# Patient Record
Sex: FEMALE | Race: WHITE | Employment: FULL TIME | URBAN - METROPOLITAN AREA
[De-identification: names, ages, dates, MRNs, and addresses within clinical notes are randomized per-mention and may not be internally consistent; named-entity substitution may affect disease eponyms.]

---

## 2018-07-11 ENCOUNTER — HOSPITAL ENCOUNTER (EMERGENCY)
Age: 23
Discharge: HOME | End: 2018-07-11
Attending: EMERGENCY MEDICINE
Payer: COMMERCIAL

## 2018-07-11 VITALS
OXYGEN SATURATION: 100 % | HEIGHT: 66 IN | HEART RATE: 106 BPM | SYSTOLIC BLOOD PRESSURE: 143 MMHG | DIASTOLIC BLOOD PRESSURE: 91 MMHG | WEIGHT: 180 LBS | TEMPERATURE: 98.6 F | BODY MASS INDEX: 28.93 KG/M2 | RESPIRATION RATE: 16 BRPM

## 2018-07-11 DIAGNOSIS — R19.7 DIARRHEA, UNSPECIFIED TYPE: ICD-10-CM

## 2018-07-11 DIAGNOSIS — R10.9 ACUTE ABDOMINAL PAIN: Primary | ICD-10-CM

## 2018-07-11 LAB
ALBUMIN SERPL-MCNC: 3.9 G/DL (ref 3.5–5)
ALBUMIN/GLOB SERPL: 1.1 {RATIO} (ref 1.2–3.5)
ALP SERPL-CCNC: 63 U/L (ref 50–136)
ALT SERPL-CCNC: 31 U/L (ref 12–65)
ANION GAP SERPL CALC-SCNC: 3 MMOL/L (ref 7–16)
AST SERPL-CCNC: 28 U/L (ref 15–37)
BACTERIA URNS QL MICRO: 0 /HPF
BASOPHILS # BLD: 0 K/UL (ref 0–0.2)
BASOPHILS NFR BLD: 0 % (ref 0–2)
BILIRUB SERPL-MCNC: 0.3 MG/DL (ref 0.2–1.1)
BUN SERPL-MCNC: 9 MG/DL (ref 6–23)
CALCIUM SERPL-MCNC: 8.8 MG/DL (ref 8.3–10.4)
CASTS URNS QL MICRO: 0 /LPF
CHLORIDE SERPL-SCNC: 103 MMOL/L (ref 98–107)
CO2 SERPL-SCNC: 28 MMOL/L (ref 21–32)
CREAT SERPL-MCNC: 0.79 MG/DL (ref 0.6–1)
CRYSTALS URNS QL MICRO: 0 /LPF
DIFFERENTIAL METHOD BLD: ABNORMAL
EOSINOPHIL # BLD: 0.3 K/UL (ref 0–0.8)
EOSINOPHIL NFR BLD: 3 % (ref 0.5–7.8)
EPI CELLS #/AREA URNS HPF: NORMAL /HPF
ERYTHROCYTE [DISTWIDTH] IN BLOOD BY AUTOMATED COUNT: 13.4 % (ref 11.9–14.6)
GLOBULIN SER CALC-MCNC: 3.7 G/DL (ref 2.3–3.5)
GLUCOSE SERPL-MCNC: 102 MG/DL (ref 65–100)
HCG UR QL: NEGATIVE
HCT VFR BLD AUTO: 38.4 % (ref 35.8–46.3)
HGB BLD-MCNC: 12.7 G/DL (ref 11.7–15.4)
IMM GRANULOCYTES # BLD: 0 K/UL (ref 0–0.5)
IMM GRANULOCYTES NFR BLD AUTO: 0 % (ref 0–5)
LIPASE SERPL-CCNC: 114 U/L (ref 73–393)
LYMPHOCYTES # BLD: 1.6 K/UL (ref 0.5–4.6)
LYMPHOCYTES NFR BLD: 19 % (ref 13–44)
MCH RBC QN AUTO: 26.2 PG (ref 26.1–32.9)
MCHC RBC AUTO-ENTMCNC: 33.1 G/DL (ref 31.4–35)
MCV RBC AUTO: 79.3 FL (ref 79.6–97.8)
MONOCYTES # BLD: 0.7 K/UL (ref 0.1–1.3)
MONOCYTES NFR BLD: 8 % (ref 4–12)
MUCOUS THREADS URNS QL MICRO: 0 /LPF
NEUTS SEG # BLD: 6 K/UL (ref 1.7–8.2)
NEUTS SEG NFR BLD: 70 % (ref 43–78)
PLATELET # BLD AUTO: 342 K/UL (ref 150–450)
PMV BLD AUTO: 10.4 FL (ref 10.8–14.1)
POTASSIUM SERPL-SCNC: 4.3 MMOL/L (ref 3.5–5.1)
PROT SERPL-MCNC: 7.6 G/DL (ref 6.3–8.2)
RBC # BLD AUTO: 4.84 M/UL (ref 4.05–5.25)
RBC #/AREA URNS HPF: 0 /HPF
SODIUM SERPL-SCNC: 134 MMOL/L (ref 136–145)
WBC # BLD AUTO: 8.6 K/UL (ref 4.3–11.1)
WBC URNS QL MICRO: NORMAL /HPF

## 2018-07-11 PROCEDURE — 81015 MICROSCOPIC EXAM OF URINE: CPT | Performed by: EMERGENCY MEDICINE

## 2018-07-11 PROCEDURE — 81025 URINE PREGNANCY TEST: CPT

## 2018-07-11 PROCEDURE — 83690 ASSAY OF LIPASE: CPT | Performed by: EMERGENCY MEDICINE

## 2018-07-11 PROCEDURE — 96360 HYDRATION IV INFUSION INIT: CPT | Performed by: EMERGENCY MEDICINE

## 2018-07-11 PROCEDURE — 85025 COMPLETE CBC W/AUTO DIFF WBC: CPT | Performed by: EMERGENCY MEDICINE

## 2018-07-11 PROCEDURE — 99283 EMERGENCY DEPT VISIT LOW MDM: CPT | Performed by: EMERGENCY MEDICINE

## 2018-07-11 PROCEDURE — 81003 URINALYSIS AUTO W/O SCOPE: CPT | Performed by: EMERGENCY MEDICINE

## 2018-07-11 PROCEDURE — 80053 COMPREHEN METABOLIC PANEL: CPT | Performed by: EMERGENCY MEDICINE

## 2018-07-11 PROCEDURE — 74011250636 HC RX REV CODE- 250/636: Performed by: EMERGENCY MEDICINE

## 2018-07-11 RX ORDER — TRAMADOL HYDROCHLORIDE 50 MG/1
50 TABLET ORAL
Qty: 15 TAB | Refills: 0 | Status: SHIPPED | OUTPATIENT
Start: 2018-07-11 | End: 2018-08-31

## 2018-07-11 RX ADMIN — SODIUM CHLORIDE 1000 ML: 900 INJECTION, SOLUTION INTRAVENOUS at 17:57

## 2018-07-11 NOTE — ED TRIAGE NOTES
Abdominal cramping and vomiting over 3 weeks. Has seen PCP, had ultrasound, xray, labs, treated with antibiotics, advil. Symptoms initially improved and returned. Has apt with gastroenterologist, and ob/gyn on the 19th. However pain is intense and patient was advised by pcp to come to ED.

## 2018-07-11 NOTE — DISCHARGE INSTRUCTIONS
Consider cultured yogurt and half an apple at least once daily. Abdominal Pain: Care Instructions  Your Care Instructions    Abdominal pain has many possible causes. Some aren't serious and get better on their own in a few days. Others need more testing and treatment. If your pain continues or gets worse, you need to be rechecked and may need more tests to find out what is wrong. You may need surgery to correct the problem. Don't ignore new symptoms, such as fever, nausea and vomiting, urination problems, pain that gets worse, and dizziness. These may be signs of a more serious problem. Your doctor may have recommended a follow-up visit in the next 8 to 12 hours. If you are not getting better, you may need more tests or treatment. The doctor has checked you carefully, but problems can develop later. If you notice any problems or new symptoms, get medical treatment right away. Follow-up care is a key part of your treatment and safety. Be sure to make and go to all appointments, and call your doctor if you are having problems. It's also a good idea to know your test results and keep a list of the medicines you take. How can you care for yourself at home? · Rest until you feel better. · To prevent dehydration, drink plenty of fluids, enough so that your urine is light yellow or clear like water. Choose water and other caffeine-free clear liquids until you feel better. If you have kidney, heart, or liver disease and have to limit fluids, talk with your doctor before you increase the amount of fluids you drink. · If your stomach is upset, eat mild foods, such as rice, dry toast or crackers, bananas, and applesauce. Try eating several small meals instead of two or three large ones. · Wait until 48 hours after all symptoms have gone away before you have spicy foods, alcohol, and drinks that contain caffeine. · Do not eat foods that are high in fat.   · Avoid anti-inflammatory medicines such as aspirin, ibuprofen (Advil, Motrin), and naproxen (Aleve). These can cause stomach upset. Talk to your doctor if you take daily aspirin for another health problem. When should you call for help? Call 911 anytime you think you may need emergency care. For example, call if:    · You passed out (lost consciousness).     · You pass maroon or very bloody stools.     · You vomit blood or what looks like coffee grounds.     · You have new, severe belly pain.    Call your doctor now or seek immediate medical care if:    · Your pain gets worse, especially if it becomes focused in one area of your belly.     · You have a new or higher fever.     · Your stools are black and look like tar, or they have streaks of blood.     · You have unexpected vaginal bleeding.     · You have symptoms of a urinary tract infection. These may include:  ¨ Pain when you urinate. ¨ Urinating more often than usual.  ¨ Blood in your urine.     · You are dizzy or lightheaded, or you feel like you may faint.    Watch closely for changes in your health, and be sure to contact your doctor if:    · You are not getting better after 1 day (24 hours). Where can you learn more? Go to http://bartoloReceeptshraddha.info/. Enter W986 in the search box to learn more about \"Abdominal Pain: Care Instructions. \"  Current as of: November 20, 2017  Content Version: 11.7  © 2354-1112 [employer]. Care instructions adapted under license by [employer] (which disclaims liability or warranty for this information). If you have questions about a medical condition or this instruction, always ask your healthcare professional. [place] [address] any warranty or liability for your use of this information. Diarrhea: Care Instructions  Your Care Instructions    Diarrhea is loose, watery stools (bowel movements). The exact cause is often hard to find.  Sometimes diarrhea is your body's way of getting rid of what caused an upset stomach. Viruses, food poisoning, and many medicines can cause diarrhea. Some people get diarrhea in response to emotional stress, anxiety, or certain foods. Almost everyone has diarrhea now and then. It usually isn't serious, and your stools will return to normal soon. The important thing to do is replace the fluids you have lost, so you can prevent dehydration. The doctor has checked you carefully, but problems can develop later. If you notice any problems or new symptoms, get medical treatment right away. Follow-up care is a key part of your treatment and safety. Be sure to make and go to all appointments, and call your doctor if you are having problems. It's also a good idea to know your test results and keep a list of the medicines you take. How can you care for yourself at home? · Watch for signs of dehydration, which means your body has lost too much water. Dehydration is a serious condition and should be treated right away. Signs of dehydration are:  ¨ Increasing thirst and dry eyes and mouth. ¨ Feeling faint or lightheaded. ¨ Darker urine, and a smaller amount of urine than normal.  · To prevent dehydration, drink plenty of fluids, enough so that your urine is light yellow or clear like water. Choose water and other caffeine-free clear liquids until you feel better. If you have kidney, heart, or liver disease and have to limit fluids, talk with your doctor before you increase the amount of fluids you drink. · Begin eating small amounts of mild foods the next day, if you feel like it. ¨ Try yogurt that has live cultures of Lactobacillus. (Check the label.)  ¨ Avoid spicy foods, fruits, alcohol, and caffeine until 48 hours after all symptoms are gone. ¨ Avoid chewing gum that contains sorbitol. ¨ Avoid dairy products (except for yogurt with Lactobacillus) while you have diarrhea and for 3 days after symptoms are gone.   · The doctor may recommend that you take over-the-counter medicine, such as loperamide (Imodium), if you still have diarrhea after 6 hours. Read and follow all instructions on the label. Do not use this medicine if you have bloody diarrhea, a high fever, or other signs of serious illness. Call your doctor if you think you are having a problem with your medicine. When should you call for help? Call 911 anytime you think you may need emergency care. For example, call if:    · You passed out (lost consciousness).     · Your stools are maroon or very bloody.    Call your doctor now or seek immediate medical care if:    · You are dizzy or lightheaded, or you feel like you may faint.     · Your stools are black and look like tar, or they have streaks of blood.     · You have new or worse belly pain.     · You have symptoms of dehydration, such as:  ¨ Dry eyes and a dry mouth. ¨ Passing only a little dark urine. ¨ Feeling thirstier than usual.     · You have a new or higher fever.    Watch closely for changes in your health, and be sure to contact your doctor if:    · Your diarrhea is getting worse.     · You see pus in the diarrhea.     · You are not getting better after 2 days (48 hours). Where can you learn more? Go to http://bartolo-shraddha.info/. Enter Z370 in the search box to learn more about \"Diarrhea: Care Instructions. \"  Current as of: November 20, 2017  Content Version: 11.7  © 7323-7151 [employer]. Care instructions adapted under license by [employer] (which disclaims liability or warranty for this information). If you have questions about a medical condition or this instruction, always ask your healthcare professional. [place] [address] any warranty or liability for your use of this information.

## 2018-07-11 NOTE — LETTER
[facility] EMERGENCY DEPT 
[facility] [address] [place] [id number] 
[phone] Work/School Note Date: 7/11/2018 To Whom It May concern: 
 
Slade Cook was seen and treated today in the emergency room by the following provider(s): 
Attending Provider: Noemí Dee MD. Slade Cook may return to work on 7/13/18.  
 
Sincerely, 
 
 
 
 
Faiza Veras RN

## 2018-07-11 NOTE — ED NOTES
I have reviewed discharge instructions with the patient. The patient verbalized understanding. Patient left ED via Discharge Method: ambulatory to Home with her mother    Opportunity for questions and clarification provided. Patient given 1 scripts. To continue your aftercare when you leave the hospital, you may receive an automated call from our care team to check in on how you are doing. This is a free service and part of our promise to provide the best care and service to meet your aftercare needs.  If you have questions, or wish to unsubscribe from this service please call [phone]. Thank you for Choosing our [facility] Emergency Department.

## 2018-07-11 NOTE — ED PROVIDER NOTES
HPI Comments: Patient complains of abdominal pain and cramping over the last 3-4 weeks. Has been seen a couple times her family doctor, screening labs x-ray ultrasound and treated with Advil, Bentyl, question of antibiotics. Currently scheduled follow-up with a gastroenterologist on the 17th and her OB/GYN on the 19th. Last menstrual period 2-1/2 weeks ago and normal for the patient. Patient is a 26 y.o. female presenting with abdominal pain. The history is provided by the patient and a parent. Abdominal Pain    This is a new problem. The current episode started more than 1 week ago. The problem occurs daily. The problem has not changed since onset. The pain is associated with an unknown factor. The pain is located in the generalized abdominal region, RUQ, suprapubic region and LLQ. The quality of the pain is aching and sharp. The pain is at a severity of 7/10. Associated symptoms include diarrhea (for the past month, now about 3 times a day), vomiting (a couple of weeks ago) and dysuria (occasional). Pertinent negatives include no anorexia, no fever, no belching, no flatus, no hematochezia, no melena, no nausea, no constipation, no frequency, no hematuria, no headaches, no arthralgias, no myalgias, no trauma, no chest pain and no back pain. Exacerbated by: possibly stress. The pain is relieved by nothing. Past workup includes ultrasound. Past workup comments: scheduled to see gastroenterology on the 17th this month. Her past medical history is significant for ovarian cysts. Her past medical history does not include PUD, gallstones, GERD, ulcerative colitis, Crohn's disease, irritable bowel syndrome, cancer, UTI, pancreatitis, ectopic pregnancy, diverticulitis, atrial fibrillation, DM, kidney stones or small bowel obstruction. The patient's surgical history non-contributory. History reviewed. No pertinent past medical history. History reviewed. No pertinent surgical history. History reviewed.  No pertinent family history. Social History     Social History    Marital status: SINGLE     Spouse name: N/A    Number of children: N/A    Years of education: N/A     Occupational History    Not on file. Social History Main Topics    Smoking status: Not on file    Smokeless tobacco: Not on file    Alcohol use Not on file    Drug use: Not on file    Sexual activity: Not on file     Other Topics Concern    Not on file     Social History Narrative    No narrative on file         ALLERGIES: Review of patient's allergies indicates no known allergies. Review of Systems   Constitutional: Positive for activity change, appetite change and fatigue. Negative for chills and fever. Cardiovascular: Negative for chest pain. Gastrointestinal: Positive for abdominal pain, diarrhea (for the past month, now about 3 times a day) and vomiting (a couple of weeks ago). Negative for abdominal distention, anal bleeding, anorexia, blood in stool, constipation, flatus, hematochezia, melena and nausea. Genitourinary: Positive for dysuria (occasional). Negative for frequency and hematuria. Musculoskeletal: Negative for arthralgias, back pain and myalgias. Neurological: Negative for headaches. All other systems reviewed and are negative. Vitals:    07/11/18 1656   BP: (!) 164/103   Pulse: (!) 106   Resp: 18   Temp: 99.8 °F (37.7 °C)   SpO2: 98%   Weight: 81.6 kg (180 lb)   Height: 5' 6\" (1.676 m)            Physical Exam   Constitutional: She is oriented to person, place, and time. She appears well-developed and well-nourished. No distress. HENT:   Head: Normocephalic and atraumatic. Right Ear: Tympanic membrane and external ear normal.   Left Ear: Tympanic membrane and external ear normal.   Mouth/Throat: Oropharynx is clear and moist.   Eyes: Conjunctivae and EOM are normal. Pupils are equal, round, and reactive to light. Neck: Normal range of motion. Neck supple. No tracheal deviation present. Cardiovascular: Normal rate, regular rhythm, normal heart sounds and intact distal pulses. Exam reveals no gallop and no friction rub. No murmur heard. Pulmonary/Chest: Effort normal and breath sounds normal. No respiratory distress. She has no wheezes. Abdominal: Soft. Bowel sounds are normal. She exhibits no distension and no mass. There is no hepatosplenomegaly. There is no tenderness. There is no rebound and no guarding. Musculoskeletal: Normal range of motion. She exhibits no edema. Lymphadenopathy:     She has no cervical adenopathy. Neurological: She is alert and oriented to person, place, and time. She displays normal reflexes. No cranial nerve deficit. Skin: Skin is warm and dry. No rash noted. She is not diaphoretic. No erythema. Psychiatric: She has a normal mood and affect. Nursing note and vitals reviewed.        MDM  Number of Diagnoses or Management Options  Acute abdominal pain: new and requires workup  Diarrhea, unspecified type: new and requires workup     Amount and/or Complexity of Data Reviewed  Clinical lab tests: ordered and reviewed  Review and summarize past medical records: yes    Risk of Complications, Morbidity, and/or Mortality  Presenting problems: high  Diagnostic procedures: minimal  Management options: moderate    Patient Progress  Patient progress: improved        ED Course       Procedures      Results Reviewed:      Recent Results (from the past 24 hour(s))   CBC WITH AUTOMATED DIFF    Collection Time: 07/11/18  5:18 PM   Result Value Ref Range    WBC 8.6 4.3 - 11.1 K/uL    RBC 4.84 4.05 - 5.25 M/uL    HGB 12.7 11.7 - 15.4 g/dL    HCT 38.4 35.8 - 46.3 %    MCV 79.3 (L) 79.6 - 97.8 FL    MCH 26.2 26.1 - 32.9 PG    MCHC 33.1 31.4 - 35.0 g/dL    RDW 13.4 11.9 - 14.6 %    PLATELET 160 470 - 678 K/uL    MPV 10.4 (L) 10.8 - 14.1 FL    DF AUTOMATED      NEUTROPHILS 70 43 - 78 %    LYMPHOCYTES 19 13 - 44 %    MONOCYTES 8 4.0 - 12.0 %    EOSINOPHILS 3 0.5 - 7.8 % BASOPHILS 0 0.0 - 2.0 %    IMMATURE GRANULOCYTES 0 0.0 - 5.0 %    ABS. NEUTROPHILS 6.0 1.7 - 8.2 K/UL    ABS. LYMPHOCYTES 1.6 0.5 - 4.6 K/UL    ABS. MONOCYTES 0.7 0.1 - 1.3 K/UL    ABS. EOSINOPHILS 0.3 0.0 - 0.8 K/UL    ABS. BASOPHILS 0.0 0.0 - 0.2 K/UL    ABS. IMM. GRANS. 0.0 0.0 - 0.5 K/UL   METABOLIC PANEL, COMPREHENSIVE    Collection Time: 07/11/18  5:18 PM   Result Value Ref Range    Sodium 134 (L) 136 - 145 mmol/L    Potassium 4.3 3.5 - 5.1 mmol/L    Chloride 103 98 - 107 mmol/L    CO2 28 21 - 32 mmol/L    Anion gap 3 (L) 7 - 16 mmol/L    Glucose 102 (H) 65 - 100 mg/dL    BUN 9 6 - 23 MG/DL    Creatinine 0.79 0.6 - 1.0 MG/DL    GFR est AA >60 >60 ml/min/1.73m2    GFR est non-AA >60 >60 ml/min/1.73m2    Calcium 8.8 8.3 - 10.4 MG/DL    Bilirubin, total 0.3 0.2 - 1.1 MG/DL    ALT (SGPT) 31 12 - 65 U/L    AST (SGOT) 28 15 - 37 U/L    Alk. phosphatase 63 50 - 136 U/L    Protein, total 7.6 6.3 - 8.2 g/dL    Albumin 3.9 3.5 - 5.0 g/dL    Globulin 3.7 (H) 2.3 - 3.5 g/dL    A-G Ratio 1.1 (L) 1.2 - 3.5     LIPASE    Collection Time: 07/11/18  5:18 PM   Result Value Ref Range    Lipase 114 73 - 393 U/L   HCG URINE, QL. - POC    Collection Time: 07/11/18  5:49 PM   Result Value Ref Range    Pregnancy test,urine (POC) NEGATIVE  NEG     URINE MICROSCOPIC    Collection Time: 07/11/18  5:55 PM   Result Value Ref Range    WBC 3-5 0 /hpf    RBC 0 0 /hpf    Epithelial cells 0-3 0 /hpf    Bacteria 0 0 /hpf    Casts 0 0 /lpf    Crystals, urine 0 0 /LPF    Mucus 0 0 /lpf       I discussed the results of all labs, procedures, radiographs, and treatments with the patient and available family. Treatment plan is agreed upon and the patient is ready for discharge. All voiced understanding of the discharge plan and medication instructions or changes as appropriate. Questions about treatment in the ED were answered. All were encouraged to return should symptoms worsen or new problems develop.

## 2018-07-19 PROBLEM — N93.9 MENSTRUAL BLEEDING PROBLEM: Status: ACTIVE | Noted: 2018-07-19

## 2018-07-19 PROBLEM — R10.2 PELVIC PAIN IN FEMALE: Status: ACTIVE | Noted: 2018-07-19

## 2018-07-22 ENCOUNTER — HOSPITAL ENCOUNTER (EMERGENCY)
Age: 23
Discharge: HOME | End: 2018-07-22
Attending: EMERGENCY MEDICINE
Payer: COMMERCIAL

## 2018-07-22 VITALS
TEMPERATURE: 98 F | DIASTOLIC BLOOD PRESSURE: 85 MMHG | WEIGHT: 220 LBS | HEART RATE: 76 BPM | SYSTOLIC BLOOD PRESSURE: 144 MMHG | HEIGHT: 66 IN | RESPIRATION RATE: 20 BRPM | BODY MASS INDEX: 35.36 KG/M2 | OXYGEN SATURATION: 97 %

## 2018-07-22 DIAGNOSIS — R10.84 ABDOMINAL PAIN, GENERALIZED: Primary | ICD-10-CM

## 2018-07-22 DIAGNOSIS — N39.0 URINARY TRACT INFECTION WITHOUT HEMATURIA, SITE UNSPECIFIED: ICD-10-CM

## 2018-07-22 LAB
ALBUMIN SERPL-MCNC: 3.9 G/DL (ref 3.5–5)
ALBUMIN/GLOB SERPL: 0.9 {RATIO} (ref 1.2–3.5)
ALP SERPL-CCNC: 69 U/L (ref 50–136)
ALT SERPL-CCNC: 25 U/L (ref 12–65)
ANION GAP SERPL CALC-SCNC: 12 MMOL/L (ref 7–16)
AST SERPL-CCNC: 40 U/L (ref 15–37)
BACTERIA URNS QL MICRO: ABNORMAL /HPF
BASOPHILS # BLD: 0 K/UL (ref 0–0.2)
BASOPHILS NFR BLD: 1 % (ref 0–2)
BILIRUB SERPL-MCNC: 0.4 MG/DL (ref 0.2–1.1)
BUN SERPL-MCNC: 7 MG/DL (ref 6–23)
CALCIUM SERPL-MCNC: 9.1 MG/DL (ref 8.3–10.4)
CASTS URNS QL MICRO: ABNORMAL /LPF
CHLORIDE SERPL-SCNC: 104 MMOL/L (ref 98–107)
CO2 SERPL-SCNC: 23 MMOL/L (ref 21–32)
CREAT SERPL-MCNC: 0.78 MG/DL (ref 0.6–1)
DIFFERENTIAL METHOD BLD: ABNORMAL
EOSINOPHIL # BLD: 0.2 K/UL (ref 0–0.8)
EOSINOPHIL NFR BLD: 2 % (ref 0.5–7.8)
EPI CELLS #/AREA URNS HPF: ABNORMAL /HPF
ERYTHROCYTE [DISTWIDTH] IN BLOOD BY AUTOMATED COUNT: 13.1 % (ref 11.9–14.6)
GLOBULIN SER CALC-MCNC: 4.5 G/DL (ref 2.3–3.5)
GLUCOSE SERPL-MCNC: 91 MG/DL (ref 65–100)
HCG UR QL: NEGATIVE
HCT VFR BLD AUTO: 42.7 % (ref 35.8–46.3)
HGB BLD-MCNC: 14.3 G/DL (ref 11.7–15.4)
IMM GRANULOCYTES # BLD: 0 K/UL (ref 0–0.5)
IMM GRANULOCYTES NFR BLD AUTO: 0 % (ref 0–5)
LIPASE SERPL-CCNC: 49 U/L (ref 73–393)
LYMPHOCYTES # BLD: 1.9 K/UL (ref 0.5–4.6)
LYMPHOCYTES NFR BLD: 23 % (ref 13–44)
MCH RBC QN AUTO: 27.1 PG (ref 26.1–32.9)
MCHC RBC AUTO-ENTMCNC: 33.5 G/DL (ref 31.4–35)
MCV RBC AUTO: 80.9 FL (ref 79.6–97.8)
MONOCYTES # BLD: 0.5 K/UL (ref 0.1–1.3)
MONOCYTES NFR BLD: 6 % (ref 4–12)
NEUTS SEG # BLD: 5.8 K/UL (ref 1.7–8.2)
NEUTS SEG NFR BLD: 68 % (ref 43–78)
PLATELET # BLD AUTO: 328 K/UL (ref 150–450)
PMV BLD AUTO: 10.9 FL (ref 10.8–14.1)
POTASSIUM SERPL-SCNC: 4.7 MMOL/L (ref 3.5–5.1)
PROT SERPL-MCNC: 8.4 G/DL (ref 6.3–8.2)
RBC # BLD AUTO: 5.28 M/UL (ref 4.05–5.25)
RBC #/AREA URNS HPF: ABNORMAL /HPF
SODIUM SERPL-SCNC: 139 MMOL/L (ref 136–145)
WBC # BLD AUTO: 8.4 K/UL (ref 4.3–11.1)
WBC URNS QL MICRO: ABNORMAL /HPF

## 2018-07-22 PROCEDURE — 83690 ASSAY OF LIPASE: CPT | Performed by: EMERGENCY MEDICINE

## 2018-07-22 PROCEDURE — 80053 COMPREHEN METABOLIC PANEL: CPT | Performed by: EMERGENCY MEDICINE

## 2018-07-22 PROCEDURE — 81003 URINALYSIS AUTO W/O SCOPE: CPT | Performed by: EMERGENCY MEDICINE

## 2018-07-22 PROCEDURE — 81025 URINE PREGNANCY TEST: CPT

## 2018-07-22 PROCEDURE — 96372 THER/PROPH/DIAG INJ SC/IM: CPT | Performed by: EMERGENCY MEDICINE

## 2018-07-22 PROCEDURE — 81015 MICROSCOPIC EXAM OF URINE: CPT | Performed by: EMERGENCY MEDICINE

## 2018-07-22 PROCEDURE — 74011250636 HC RX REV CODE- 250/636: Performed by: EMERGENCY MEDICINE

## 2018-07-22 PROCEDURE — 85025 COMPLETE CBC W/AUTO DIFF WBC: CPT | Performed by: EMERGENCY MEDICINE

## 2018-07-22 PROCEDURE — 74011250637 HC RX REV CODE- 250/637: Performed by: EMERGENCY MEDICINE

## 2018-07-22 PROCEDURE — 99284 EMERGENCY DEPT VISIT MOD MDM: CPT | Performed by: EMERGENCY MEDICINE

## 2018-07-22 RX ORDER — HYOSCYAMINE SULFATE 0.12 MG/1
0.12 TABLET SUBLINGUAL
Qty: 20 TAB | Refills: 0 | Status: SHIPPED | OUTPATIENT
Start: 2018-07-22 | End: 2020-03-04

## 2018-07-22 RX ORDER — SULFAMETHOXAZOLE AND TRIMETHOPRIM 800; 160 MG/1; MG/1
1 TABLET ORAL 2 TIMES DAILY
Qty: 6 TAB | Refills: 0 | Status: SHIPPED | OUTPATIENT
Start: 2018-07-22 | End: 2018-07-25

## 2018-07-22 RX ORDER — KETOROLAC TROMETHAMINE 30 MG/ML
60 INJECTION, SOLUTION INTRAMUSCULAR; INTRAVENOUS
Status: COMPLETED | OUTPATIENT
Start: 2018-07-22 | End: 2018-07-22

## 2018-07-22 RX ORDER — ONDANSETRON 8 MG/1
8 TABLET, ORALLY DISINTEGRATING ORAL
Qty: 8 TAB | Refills: 1 | Status: SHIPPED | OUTPATIENT
Start: 2018-07-22 | End: 2018-08-16

## 2018-07-22 RX ORDER — SULFAMETHOXAZOLE AND TRIMETHOPRIM 800; 160 MG/1; MG/1
1 TABLET ORAL
Status: COMPLETED | OUTPATIENT
Start: 2018-07-22 | End: 2018-07-22

## 2018-07-22 RX ADMIN — SULFAMETHOXAZOLE AND TRIMETHOPRIM 1 TABLET: 800; 160 TABLET ORAL at 19:50

## 2018-07-22 RX ADMIN — KETOROLAC TROMETHAMINE 60 MG: 30 INJECTION, SOLUTION INTRAMUSCULAR at 18:16

## 2018-07-22 NOTE — ED NOTES
I have reviewed discharge instructions with the patient. The patient verbalized understanding. Patient left ED via Discharge Method: ambulatory to Home with (insert name of family/friend, self, transport self). Opportunity for questions and clarification provided. Patient given 3 scripts. To continue your aftercare when you leave the hospital, you may receive an automated call from our care team to check in on how you are doing. This is a free service and part of our promise to provide the best care and service to meet your aftercare needs.  If you have questions, or wish to unsubscribe from this service please call [phone]. Thank you for Choosing our [facility] Emergency Department.

## 2018-07-22 NOTE — ED PROVIDER NOTES
HPI Comments: Here with some abdominal pain. Pain is mainly upper abdomen. She has been seen by both GI affiliated with [facility] and by Dr. Mike Whyte who she saw on July 19. Pain is both right upper quadrant and lower abdomen. Told she might have endometriosis there were no confirmation of this. Previous ultrasound has been reported as negative. the symptoms been ongoing now for approximately one and half months. No specific food provokers. No alcohol. No blood in her stool or melena. Sometimes has vomiting but only approximately 1 time per day and this is not typical    Patient is a 26 y.o. female presenting with abdominal pain. The history is provided by the patient. Abdominal Pain    This is a recurrent problem. Episode onset: 4-6 weeks. The problem has not changed since onset. The pain is associated with an unknown factor. The pain is located in the RUQ. Associated symptoms include diarrhea and vomiting. Pertinent negatives include no hematochezia, no melena, no constipation, no dysuria, no frequency and no hematuria. The pain is relieved by nothing. Past workup includes ultrasound. No past medical history on file. Past Surgical History:   Procedure Laterality Date    HX ORTHOPAEDIC Left     ankle reconstruction         Family History:   Problem Relation Age of Onset    Cancer Maternal Grandmother      Breast, ovarian       Social History     Social History    Marital status: SINGLE     Spouse name: N/A    Number of children: N/A    Years of education: N/A     Occupational History    Not on file. Social History Main Topics    Smoking status: Never Smoker    Smokeless tobacco: Never Used    Alcohol use No    Drug use: No    Sexual activity: No     Other Topics Concern    Not on file     Social History Narrative         ALLERGIES: Review of patient's allergies indicates no known allergies. Review of Systems   HENT: Negative.     Gastrointestinal: Positive for abdominal pain, diarrhea and vomiting. Negative for constipation, hematochezia and melena. Genitourinary: Negative for dysuria, frequency and hematuria. All other systems reviewed and are negative. Vitals:    07/22/18 1723   BP: (!) 134/97   Pulse: 100   Resp: 20   Temp: 99.1 °F (37.3 °C)   SpO2: 97%   Weight: 99.8 kg (220 lb)   Height: 5' 6\" (1.676 m)            Physical Exam     MDM  Number of Diagnoses or Management Options  Abdominal pain, generalized:   Urinary tract infection without hematuria, site unspecified:   Diagnosis management comments: Patient has urinary tract infection but really at this point no symptoms and not the cause of her presenting problems per se. Additionally she has recurring upper abdominal discomfort. Prior ultrasound is negative. No specific food provokers. Consideration of a HIDA scan could be entertained but not available through ER stay today. No acute surgical findings.        Amount and/or Complexity of Data Reviewed  Clinical lab tests: ordered and reviewed  Decide to obtain previous medical records or to obtain history from someone other than the patient: yes  Obtain history from someone other than the patient: yes    Risk of Complications, Morbidity, and/or Mortality  Presenting problems: moderate  Diagnostic procedures: low  Management options: moderate    Patient Progress  Patient progress: stable        ED Course       Procedures

## 2018-07-22 NOTE — Clinical Note
Continue Prilosec, Nexium, or similar Zofran for nausea We have called referral to [facility] Contact , if unable to see GI  For ongoing workup that might include a HIDA scan

## 2018-07-22 NOTE — ED NOTES
Patient to ED via POV. Patient reports ABD/epigastric pain. Patient states seen by OB/GI on Thursday. Patient states they wanted to do a CT, HYDA, endoscopy because they think it may be her gallbladder. OB states they believe it may be endometriosis. Patient states was at an ER 1.5 weeks ago. Patient states she was given Rx for ultram w/o relief. Patient reports onset of symptoms appx 1.5 months ago. Patient reports nausea, vomiting 1x a day over the past few days. Patient reports diarrhea daily over past 1.5 months with no blood noted. Patient denies any urinary symptoms. Patient reports was recently on her period, just went off yesterday. Patient does report some vag pain w/o discharge. Patient denies any recent traumatic process.

## 2018-07-22 NOTE — DISCHARGE INSTRUCTIONS
Abdominal Pain: Care Instructions  Your Care Instructions    Abdominal pain has many possible causes. Some aren't serious and get better on their own in a few days. Others need more testing and treatment. If your pain continues or gets worse, you need to be rechecked and may need more tests to find out what is wrong. You may need surgery to correct the problem. Don't ignore new symptoms, such as fever, nausea and vomiting, urination problems, pain that gets worse, and dizziness. These may be signs of a more serious problem. Your doctor may have recommended a follow-up visit in the next 8 to 12 hours. If you are not getting better, you may need more tests or treatment. The doctor has checked you carefully, but problems can develop later. If you notice any problems or new symptoms, get medical treatment right away. Follow-up care is a key part of your treatment and safety. Be sure to make and go to all appointments, and call your doctor if you are having problems. It's also a good idea to know your test results and keep a list of the medicines you take. How can you care for yourself at home? · Rest until you feel better. · To prevent dehydration, drink plenty of fluids, enough so that your urine is light yellow or clear like water. Choose water and other caffeine-free clear liquids until you feel better. If you have kidney, heart, or liver disease and have to limit fluids, talk with your doctor before you increase the amount of fluids you drink. · If your stomach is upset, eat mild foods, such as rice, dry toast or crackers, bananas, and applesauce. Try eating several small meals instead of two or three large ones. · Wait until 48 hours after all symptoms have gone away before you have spicy foods, alcohol, and drinks that contain caffeine. · Do not eat foods that are high in fat. · Avoid anti-inflammatory medicines such as aspirin, ibuprofen (Advil, Motrin), and naproxen (Aleve).  These can cause stomach upset. Talk to your doctor if you take daily aspirin for another health problem. When should you call for help? Call 911 anytime you think you may need emergency care. For example, call if:    · You passed out (lost consciousness).     · You pass maroon or very bloody stools.     · You vomit blood or what looks like coffee grounds.     · You have new, severe belly pain.    Call your doctor now or seek immediate medical care if:    · Your pain gets worse, especially if it becomes focused in one area of your belly.     · You have a new or higher fever.     · Your stools are black and look like tar, or they have streaks of blood.     · You have unexpected vaginal bleeding.     · You have symptoms of a urinary tract infection. These may include:  ¨ Pain when you urinate. ¨ Urinating more often than usual.  ¨ Blood in your urine.     · You are dizzy or lightheaded, or you feel like you may faint.    Watch closely for changes in your health, and be sure to contact your doctor if:    · You are not getting better after 1 day (24 hours). Where can you learn more? Go to http://bartoloAegis Petroleum Technologyshraddha.info/. Enter D999 in the search box to learn more about \"Abdominal Pain: Care Instructions. \"  Current as of: November 20, 2017  Content Version: 11.7  © 5870-2614 [employer]. Care instructions adapted under license by [employer] (which disclaims liability or warranty for this information). If you have questions about a medical condition or this instruction, always ask your healthcare professional. [place] [address] any warranty or liability for your use of this information. Urinary Tract Infection in Women: Care Instructions  Your Care Instructions    A urinary tract infection, or UTI, is a general term for an infection anywhere between the kidneys and the urethra (where urine comes out). Most UTIs are bladder infections.  They often cause pain or burning when you urinate. UTIs are caused by bacteria and can be cured with antibiotics. Be sure to complete your treatment so that the infection goes away. Follow-up care is a key part of your treatment and safety. Be sure to make and go to all appointments, and call your doctor if you are having problems. It's also a good idea to know your test results and keep a list of the medicines you take. How can you care for yourself at home? · Take your antibiotics as directed. Do not stop taking them just because you feel better. You need to take the full course of antibiotics. · Drink extra water and other fluids for the next day or two. This may help wash out the bacteria that are causing the infection. (If you have kidney, heart, or liver disease and have to limit fluids, talk with your doctor before you increase your fluid intake.)  · Avoid drinks that are carbonated or have caffeine. They can irritate the bladder. · Urinate often. Try to empty your bladder each time. · To relieve pain, take a hot bath or lay a heating pad set on low over your lower belly or genital area. Never go to sleep with a heating pad in place. To prevent UTIs  · Drink plenty of water each day. This helps you urinate often, which clears bacteria from your system. (If you have kidney, heart, or liver disease and have to limit fluids, talk with your doctor before you increase your fluid intake.)  · Urinate when you need to. · Urinate right after you have sex. · Change sanitary pads often. · Avoid douches, bubble baths, feminine hygiene sprays, and other feminine hygiene products that have deodorants. · After going to the bathroom, wipe from front to back. When should you call for help? Call your doctor now or seek immediate medical care if:    · Symptoms such as fever, chills, nausea, or vomiting get worse or appear for the first time.     · You have new pain in your back just below your rib cage.  This is called flank pain.     · There is new blood or pus in your urine.     · You have any problems with your antibiotic medicine.    Watch closely for changes in your health, and be sure to contact your doctor if:    · You are not getting better after taking an antibiotic for 2 days.     · Your symptoms go away but then come back. Where can you learn more? Go to http://bartolo-shraddha.info/. Enter N276 in the search box to learn more about \"Urinary Tract Infection in Women: Care Instructions. \"  Current as of: May 12, 2017  Content Version: 11.7  © 0654-6623 [employer]. Care instructions adapted under license by [employer] (which disclaims liability or warranty for this information). If you have questions about a medical condition or this instruction, always ask your healthcare professional. [address] any warranty or liability for your use of this information.

## 2018-07-22 NOTE — LETTER
[address] [facility] EMERGENCY DEPT One [address] [address] 
[phone] Work/School Note Date: 7/22/2018 To Whom It May concern: 
 
Gail Underwood was seen and treated today in the emergency room by the following provider(s): 
Attending Provider: Eugene Yuen MD. Gail Underwood may return to work on 7/24/18. Sincerely, [address] [place] [address]

## 2018-08-16 PROBLEM — E66.01 SEVERE OBESITY (BMI 35.0-39.9): Status: ACTIVE | Noted: 2018-08-16

## 2018-08-31 ENCOUNTER — HOSPITAL ENCOUNTER (OUTPATIENT)
Dept: SURGERY | Age: 23
Discharge: HOME | End: 2018-08-31

## 2018-08-31 VITALS — WEIGHT: 230 LBS | HEIGHT: 66 IN | BODY MASS INDEX: 36.96 KG/M2

## 2018-08-31 RX ORDER — CHOLECALCIFEROL (VITAMIN D3) 125 MCG
5 CAPSULE ORAL
COMMUNITY
End: 2019-07-25

## 2018-08-31 RX ORDER — SUCRALFATE 1 G/1
1 TABLET ORAL 3 TIMES DAILY
COMMUNITY
End: 2018-11-19

## 2018-08-31 NOTE — PERIOP NOTES
Patient verified name, , and surgery as listed in [facility]. Type 2 surgery, phone assessment complete. Orders not received. Labs per surgeon: unknown; no orders received in EMR. Labs per anesthesia protocol: HGB. Coming to the outpatient lab. EKG: not needed per [facility] protocols. Patient answered medical/surgical history questions at their best of ability. All prior to admission medications documented in [facility]. Patient instructed to take the following medications the day of surgery according to anesthesia guidelines with a small sip of water: omeprazole, zofran if needed, zantac, carafate. Hold all vitamins 7 days prior to surgery and NSAIDS 5 days prior to surgery. Medications to be held: vitamins. Patient instructed on the following:  Arrive at A Entrance, time of arrival to be called the day before by 1700  NPO after midnight including gum, mints, and ice chips  Responsible adult must drive patient to the hospital, stay during surgery, and patient will need supervision 24 hours after anesthesia  Use dial antibacterial soap in shower 3 nights before surgery and on the morning of surgery  Leave all valuables (money and jewelry) at home but bring insurance card and ID on DOS  Do not wear make-up, nail polish, lotions, cologne, perfumes, powders, or oil on skin. Patient teach back successful and patient demonstrates knowledge of instruction.

## 2018-09-04 ENCOUNTER — HOSPITAL ENCOUNTER (OUTPATIENT)
Dept: LAB | Age: 23
Discharge: HOME | End: 2018-09-04
Attending: OBSTETRICS & GYNECOLOGY
Payer: COMMERCIAL

## 2018-09-04 LAB — HGB BLD-MCNC: 12.7 G/DL (ref 11.7–15.4)

## 2018-09-04 PROCEDURE — 36415 COLL VENOUS BLD VENIPUNCTURE: CPT

## 2018-09-04 PROCEDURE — 85018 HEMOGLOBIN: CPT

## 2018-09-04 NOTE — PERIOP NOTES
Lab results within limits per anesthesia protocol; OK for surgery. Recent Results (from the past 12 hour(s)) HEMOGLOBIN Collection Time: 09/04/18 12:35 PM  
Result Value Ref Range HGB 12.7 11.7 - 15.4 g/dL

## 2018-09-06 ENCOUNTER — ANESTHESIA EVENT (OUTPATIENT)
Dept: SURGERY | Age: 23
End: 2018-09-06
Payer: COMMERCIAL

## 2018-09-07 ENCOUNTER — HOSPITAL ENCOUNTER (OUTPATIENT)
Age: 23
Setting detail: OUTPATIENT SURGERY
Discharge: HOME | End: 2018-09-07
Attending: OBSTETRICS & GYNECOLOGY | Admitting: OBSTETRICS & GYNECOLOGY
Payer: COMMERCIAL

## 2018-09-07 ENCOUNTER — ANESTHESIA (OUTPATIENT)
Dept: SURGERY | Age: 23
End: 2018-09-07
Payer: COMMERCIAL

## 2018-09-07 VITALS
OXYGEN SATURATION: 91 % | SYSTOLIC BLOOD PRESSURE: 124 MMHG | TEMPERATURE: 97.2 F | RESPIRATION RATE: 16 BRPM | HEART RATE: 91 BPM | DIASTOLIC BLOOD PRESSURE: 69 MMHG

## 2018-09-07 DIAGNOSIS — G89.18 POSTOPERATIVE PAIN: Primary | ICD-10-CM

## 2018-09-07 LAB — HCG UR QL: NEGATIVE

## 2018-09-07 PROCEDURE — 77030039266 HC ADH SKN EXOFIN S2SG -A: Performed by: OBSTETRICS & GYNECOLOGY

## 2018-09-07 PROCEDURE — 77030020782 HC GWN BAIR PAWS FLX 3M -B: Performed by: ANESTHESIOLOGY

## 2018-09-07 PROCEDURE — 74011250636 HC RX REV CODE- 250/636: Performed by: OBSTETRICS & GYNECOLOGY

## 2018-09-07 PROCEDURE — 74011000250 HC RX REV CODE- 250: Performed by: OBSTETRICS & GYNECOLOGY

## 2018-09-07 PROCEDURE — 77030008522 HC TBNG INSUF LAPRO STRY -B: Performed by: OBSTETRICS & GYNECOLOGY

## 2018-09-07 PROCEDURE — 77030035044 HC TRCR ENDOSC VRSPRT BLDLSS COVD -C: Performed by: OBSTETRICS & GYNECOLOGY

## 2018-09-07 PROCEDURE — 76010000160 HC OR TIME 0.5 TO 1 HR INTENSV-TIER 1: Performed by: OBSTETRICS & GYNECOLOGY

## 2018-09-07 PROCEDURE — 76210000026 HC REC RM PH II 1 TO 1.5 HR: Performed by: OBSTETRICS & GYNECOLOGY

## 2018-09-07 PROCEDURE — 77030039425 HC BLD LARYNG TRULITE DISP TELE -A: Performed by: ANESTHESIOLOGY

## 2018-09-07 PROCEDURE — 77030008477 HC STYL SATN SLP COVD -A: Performed by: ANESTHESIOLOGY

## 2018-09-07 PROCEDURE — 74011000250 HC RX REV CODE- 250

## 2018-09-07 PROCEDURE — 77030031139 HC SUT VCRL2 J&J -A: Performed by: OBSTETRICS & GYNECOLOGY

## 2018-09-07 PROCEDURE — 77030008703 HC TU ET UNCUF COVD -A: Performed by: ANESTHESIOLOGY

## 2018-09-07 PROCEDURE — 77030018836 HC SOL IRR NACL ICUM -A: Performed by: OBSTETRICS & GYNECOLOGY

## 2018-09-07 PROCEDURE — 81025 URINE PREGNANCY TEST: CPT

## 2018-09-07 PROCEDURE — 76210000006 HC OR PH I REC 0.5 TO 1 HR: Performed by: OBSTETRICS & GYNECOLOGY

## 2018-09-07 PROCEDURE — 76060000032 HC ANESTHESIA 0.5 TO 1 HR: Performed by: OBSTETRICS & GYNECOLOGY

## 2018-09-07 PROCEDURE — 74011250636 HC RX REV CODE- 250/636: Performed by: ANESTHESIOLOGY

## 2018-09-07 PROCEDURE — 77030008756 HC TU IRR SUC STRY -B: Performed by: OBSTETRICS & GYNECOLOGY

## 2018-09-07 PROCEDURE — 77030035048 HC TRCR ENDOSC OPTCL COVD -B: Performed by: OBSTETRICS & GYNECOLOGY

## 2018-09-07 PROCEDURE — 77030032490 HC SLV COMPR SCD KNE COVD -B: Performed by: OBSTETRICS & GYNECOLOGY

## 2018-09-07 PROCEDURE — 77030035045 HC TRCR ENDOSC VRSPRT BLDLSS COVD -B: Performed by: OBSTETRICS & GYNECOLOGY

## 2018-09-07 PROCEDURE — 74011250637 HC RX REV CODE- 250/637: Performed by: ANESTHESIOLOGY

## 2018-09-07 PROCEDURE — 77030011502 HC MANIP UTER ZUM ZINN -B: Performed by: OBSTETRICS & GYNECOLOGY

## 2018-09-07 PROCEDURE — 77030035029 HC NDL INSUF VERES DISP COVD -B: Performed by: OBSTETRICS & GYNECOLOGY

## 2018-09-07 PROCEDURE — 74011250636 HC RX REV CODE- 250/636

## 2018-09-07 RX ORDER — ONDANSETRON 8 MG/1
4 TABLET, ORALLY DISINTEGRATING ORAL
Status: COMPLETED | OUTPATIENT
Start: 2018-09-07 | End: 2018-09-07

## 2018-09-07 RX ORDER — SODIUM CHLORIDE, SODIUM LACTATE, POTASSIUM CHLORIDE, CALCIUM CHLORIDE 600; 310; 30; 20 MG/100ML; MG/100ML; MG/100ML; MG/100ML
150 INJECTION, SOLUTION INTRAVENOUS CONTINUOUS
Status: DISCONTINUED | OUTPATIENT
Start: 2018-09-07 | End: 2018-09-07 | Stop reason: HOSPADM

## 2018-09-07 RX ORDER — ACETAMINOPHEN 500 MG
1000 TABLET ORAL
Status: DISCONTINUED | OUTPATIENT
Start: 2018-09-07 | End: 2018-09-07 | Stop reason: HOSPADM

## 2018-09-07 RX ORDER — OXYCODONE AND ACETAMINOPHEN 7.5; 325 MG/1; MG/1
1 TABLET ORAL
Qty: 15 TAB | Refills: 0 | OUTPATIENT
Start: 2018-09-07 | End: 2018-09-17

## 2018-09-07 RX ORDER — ROCURONIUM BROMIDE 10 MG/ML
INJECTION, SOLUTION INTRAVENOUS AS NEEDED
Status: DISCONTINUED | OUTPATIENT
Start: 2018-09-07 | End: 2018-09-07 | Stop reason: HOSPADM

## 2018-09-07 RX ORDER — FAMOTIDINE 20 MG/1
20 TABLET, FILM COATED ORAL ONCE
Status: COMPLETED | OUTPATIENT
Start: 2018-09-07 | End: 2018-09-07

## 2018-09-07 RX ORDER — FENTANYL CITRATE 50 UG/ML
100 INJECTION, SOLUTION INTRAMUSCULAR; INTRAVENOUS ONCE
Status: DISCONTINUED | OUTPATIENT
Start: 2018-09-07 | End: 2018-09-07 | Stop reason: HOSPADM

## 2018-09-07 RX ORDER — FENTANYL CITRATE 50 UG/ML
INJECTION, SOLUTION INTRAMUSCULAR; INTRAVENOUS AS NEEDED
Status: DISCONTINUED | OUTPATIENT
Start: 2018-09-07 | End: 2018-09-07 | Stop reason: HOSPADM

## 2018-09-07 RX ORDER — SODIUM CHLORIDE 0.9 % (FLUSH) 0.9 %
5-10 SYRINGE (ML) INJECTION AS NEEDED
Status: DISCONTINUED | OUTPATIENT
Start: 2018-09-07 | End: 2018-09-07 | Stop reason: HOSPADM

## 2018-09-07 RX ORDER — KETOROLAC TROMETHAMINE 30 MG/ML
INJECTION, SOLUTION INTRAMUSCULAR; INTRAVENOUS AS NEEDED
Status: DISCONTINUED | OUTPATIENT
Start: 2018-09-07 | End: 2018-09-07 | Stop reason: HOSPADM

## 2018-09-07 RX ORDER — SODIUM CHLORIDE 0.9 % (FLUSH) 0.9 %
5-10 SYRINGE (ML) INJECTION EVERY 8 HOURS
Status: DISCONTINUED | OUTPATIENT
Start: 2018-09-07 | End: 2018-09-07 | Stop reason: HOSPADM

## 2018-09-07 RX ORDER — ONDANSETRON 2 MG/ML
INJECTION INTRAMUSCULAR; INTRAVENOUS AS NEEDED
Status: DISCONTINUED | OUTPATIENT
Start: 2018-09-07 | End: 2018-09-07 | Stop reason: HOSPADM

## 2018-09-07 RX ORDER — LIDOCAINE HYDROCHLORIDE 10 MG/ML
0.1 INJECTION INFILTRATION; PERINEURAL AS NEEDED
Status: DISCONTINUED | OUTPATIENT
Start: 2018-09-07 | End: 2018-09-07 | Stop reason: HOSPADM

## 2018-09-07 RX ORDER — NEOSTIGMINE METHYLSULFATE 1 MG/ML
INJECTION INTRAVENOUS AS NEEDED
Status: DISCONTINUED | OUTPATIENT
Start: 2018-09-07 | End: 2018-09-07 | Stop reason: HOSPADM

## 2018-09-07 RX ORDER — DEXAMETHASONE SODIUM PHOSPHATE 4 MG/ML
INJECTION, SOLUTION INTRA-ARTICULAR; INTRALESIONAL; INTRAMUSCULAR; INTRAVENOUS; SOFT TISSUE AS NEEDED
Status: DISCONTINUED | OUTPATIENT
Start: 2018-09-07 | End: 2018-09-07 | Stop reason: HOSPADM

## 2018-09-07 RX ORDER — GLYCOPYRROLATE 0.2 MG/ML
INJECTION INTRAMUSCULAR; INTRAVENOUS AS NEEDED
Status: DISCONTINUED | OUTPATIENT
Start: 2018-09-07 | End: 2018-09-07 | Stop reason: HOSPADM

## 2018-09-07 RX ORDER — HYDROMORPHONE HYDROCHLORIDE 2 MG/ML
0.5 INJECTION, SOLUTION INTRAMUSCULAR; INTRAVENOUS; SUBCUTANEOUS
Status: DISCONTINUED | OUTPATIENT
Start: 2018-09-07 | End: 2018-09-07 | Stop reason: HOSPADM

## 2018-09-07 RX ORDER — HYDROCODONE BITARTRATE AND ACETAMINOPHEN 5; 325 MG/1; MG/1
1 TABLET ORAL AS NEEDED
Status: DISCONTINUED | OUTPATIENT
Start: 2018-09-07 | End: 2018-09-07 | Stop reason: HOSPADM

## 2018-09-07 RX ORDER — LIDOCAINE HYDROCHLORIDE 20 MG/ML
INJECTION, SOLUTION EPIDURAL; INFILTRATION; INTRACAUDAL; PERINEURAL AS NEEDED
Status: DISCONTINUED | OUTPATIENT
Start: 2018-09-07 | End: 2018-09-07 | Stop reason: HOSPADM

## 2018-09-07 RX ORDER — PROPOFOL 10 MG/ML
INJECTION, EMULSION INTRAVENOUS AS NEEDED
Status: DISCONTINUED | OUTPATIENT
Start: 2018-09-07 | End: 2018-09-07 | Stop reason: HOSPADM

## 2018-09-07 RX ORDER — SODIUM CHLORIDE 9 MG/ML
50 INJECTION, SOLUTION INTRAVENOUS CONTINUOUS
Status: DISCONTINUED | OUTPATIENT
Start: 2018-09-07 | End: 2018-09-07 | Stop reason: HOSPADM

## 2018-09-07 RX ORDER — METHYLENE BLUE 10 MG/ML
INJECTION INTRAVENOUS AS NEEDED
Status: DISCONTINUED | OUTPATIENT
Start: 2018-09-07 | End: 2018-09-07 | Stop reason: HOSPADM

## 2018-09-07 RX ORDER — ACETAMINOPHEN 10 MG/ML
1000 INJECTION, SOLUTION INTRAVENOUS ONCE
Status: COMPLETED | OUTPATIENT
Start: 2018-09-07 | End: 2018-09-07

## 2018-09-07 RX ORDER — BUPIVACAINE HYDROCHLORIDE 5 MG/ML
INJECTION, SOLUTION EPIDURAL; INTRACAUDAL AS NEEDED
Status: DISCONTINUED | OUTPATIENT
Start: 2018-09-07 | End: 2018-09-07 | Stop reason: HOSPADM

## 2018-09-07 RX ORDER — MIDAZOLAM HYDROCHLORIDE 1 MG/ML
2 INJECTION, SOLUTION INTRAMUSCULAR; INTRAVENOUS
Status: COMPLETED | OUTPATIENT
Start: 2018-09-07 | End: 2018-09-07

## 2018-09-07 RX ADMIN — KETOROLAC TROMETHAMINE 30 MG: 30 INJECTION, SOLUTION INTRAMUSCULAR; INTRAVENOUS at 11:35

## 2018-09-07 RX ADMIN — NEOSTIGMINE METHYLSULFATE 3 MG: 1 INJECTION INTRAVENOUS at 11:40

## 2018-09-07 RX ADMIN — LIDOCAINE HYDROCHLORIDE 100 MG: 20 INJECTION, SOLUTION EPIDURAL; INFILTRATION; INTRACAUDAL; PERINEURAL at 11:02

## 2018-09-07 RX ADMIN — HYDROMORPHONE HYDROCHLORIDE 0.5 MG: 2 INJECTION, SOLUTION INTRAMUSCULAR; INTRAVENOUS; SUBCUTANEOUS at 12:24

## 2018-09-07 RX ADMIN — ROCURONIUM BROMIDE 5 MG: 10 INJECTION, SOLUTION INTRAVENOUS at 11:27

## 2018-09-07 RX ADMIN — FAMOTIDINE 20 MG: 20 TABLET, FILM COATED ORAL at 09:59

## 2018-09-07 RX ADMIN — MIDAZOLAM HYDROCHLORIDE 2 MG: 1 INJECTION, SOLUTION INTRAMUSCULAR; INTRAVENOUS at 10:00

## 2018-09-07 RX ADMIN — PROPOFOL 200 MG: 10 INJECTION, EMULSION INTRAVENOUS at 11:02

## 2018-09-07 RX ADMIN — GLYCOPYRROLATE 0.4 MG: 0.2 INJECTION INTRAMUSCULAR; INTRAVENOUS at 11:40

## 2018-09-07 RX ADMIN — HYDROMORPHONE HYDROCHLORIDE 0.5 MG: 2 INJECTION, SOLUTION INTRAMUSCULAR; INTRAVENOUS; SUBCUTANEOUS at 12:09

## 2018-09-07 RX ADMIN — ROCURONIUM BROMIDE 30 MG: 10 INJECTION, SOLUTION INTRAVENOUS at 11:02

## 2018-09-07 RX ADMIN — FENTANYL CITRATE 100 MCG: 50 INJECTION, SOLUTION INTRAMUSCULAR; INTRAVENOUS at 11:02

## 2018-09-07 RX ADMIN — HYDROMORPHONE HYDROCHLORIDE 0.5 MG: 2 INJECTION, SOLUTION INTRAMUSCULAR; INTRAVENOUS; SUBCUTANEOUS at 12:16

## 2018-09-07 RX ADMIN — ONDANSETRON 4 MG: 8 TABLET, ORALLY DISINTEGRATING ORAL at 14:15

## 2018-09-07 RX ADMIN — FENTANYL CITRATE 50 MCG: 50 INJECTION, SOLUTION INTRAMUSCULAR; INTRAVENOUS at 11:26

## 2018-09-07 RX ADMIN — ACETAMINOPHEN 1000 MG: 10 INJECTION, SOLUTION INTRAVENOUS at 12:39

## 2018-09-07 RX ADMIN — DEXAMETHASONE SODIUM PHOSPHATE 10 MG: 4 INJECTION, SOLUTION INTRA-ARTICULAR; INTRALESIONAL; INTRAMUSCULAR; INTRAVENOUS; SOFT TISSUE at 11:12

## 2018-09-07 RX ADMIN — ONDANSETRON 4 MG: 2 INJECTION INTRAMUSCULAR; INTRAVENOUS at 11:12

## 2018-09-07 NOTE — IP AVS SNAPSHOT
[address] [address] 
[phone] Patient: Larry Cline MRN: [id number] About your hospitalization You were admitted on:  September 7, 2018 You last received care in the:  [facility] PACU You were discharged on:  September 7, 2018 Why you were hospitalized Your primary diagnosis was:  Not on File Follow-up Information Follow up With Details Comments Contact Info Nestor Woody MD   [facility] 
[address] [address] 
[phone] Your Scheduled Appointments Monday September 17, 2018  9:30 AM EDT  
POST OP with Nestor Woody MD  
[facility] ([address] [facility]) [facility] [address] [phone] Discharge Orders None A check maxim indicates which time of day the medication should be taken. My Medications START taking these medications Instructions Each Dose to Equal  
 Morning Noon Evening Bedtime  
 oxyCODONE-acetaminophen 7.5-325 mg per tablet Commonly known as:  PERCOCET 7.5 Your last dose was: Your next dose is: Take 1 Tab by mouth every four (4) hours as needed for Pain. Max Daily Amount: 6 Tabs. 1 Tab CHANGE how you take these medications Instructions Each Dose to Equal  
 Morning Noon Evening Bedtime  
 norgestimate-ethinyl estradiol 0.25-35 mg-mcg Tab Commonly known as:  Jose Luis Solo What changed:  when to take this Your last dose was: Your next dose is: Take 1 Tab by mouth daily. 1 Tab CONTINUE taking these medications Instructions Each Dose to Equal  
 Morning Noon Evening Bedtime CARAFATE 1 gram tablet Generic drug:  sucralfate Your last dose was: Your next dose is: Take 1 g by mouth three (3) times daily. Take / use AM day of surgery  per anesthesia protocols. 1 g  
    
   
   
   
  
 hyoscyamine SL 0.125 mg SL tablet Commonly known as:  LEVSIN/SL Your last dose was: Your next dose is:    
   
   
 1 Tab by SubLINGual route every four (4) hours as needed for Cramping.  
 0.125 mg  
    
   
   
   
  
 melatonin Tab tablet Your last dose was: Your next dose is: Take 5 mg by mouth nightly. 5 mg  
    
   
   
   
  
 omeprazole 20 mg capsule Commonly known as:  PRILOSEC Your last dose was: Your next dose is: Take 20 mg by mouth daily. 20 mg  
    
   
   
   
  
 ondansetron hcl 4 mg tablet Commonly known as:  Marisol Ewings Your last dose was: Your next dose is: TAKE ONE TABLET BY MOUTH EVERY 6 HOURS AS NEEDED FOR NAUSEA AND VOMITING  
     
   
   
   
  
 PROBIOTIC 4X 10-15 mg Tbec Generic drug:  B.infantis-B.ani-B.long-B.bifi Your last dose was: Your next dose is: Take 1 Tab by mouth daily. 1 Tab  
    
   
   
   
  
 raNITIdine 150 mg tablet Commonly known as:  ZANTAC Your last dose was: Your next dose is: TAKE ONE TABLET BY MOUTH TWICE A DAY [place] [facility] EXTRACT PO Your last dose was: Your next dose is: Take 1 Tab by mouth daily. 1 Tab Where to Get Your Medications Information on where to get these meds will be given to you by the nurse or doctor. ! Ask your nurse or doctor about these medications  
  oxyCODONE-acetaminophen 7.5-325 mg per tablet Opioid Education  Prescription Opioids: What You Need to Know: 
 
 
 
ACTIVITY  Limit activity today; increase activity tomorrow, but no vigorous exercise  Shower only; no tub baths  No douches, tampons or intercourse until your doctor releases you (at least 2 weeks)  May return to work or school as directed by your doctor DIET  Clear liquids until no nausea or vomiting  Advance to regular diet as tolerated PAIN 
 Expect a moderate amount of pain.  Take pain medication as directed by your doctor. If no prescription for pain is sent home with you, take the appropriate dose of your commonly used pain medication.  Call you doctor if pain is NOT relieved by medication.  DO NOT take aspirin or blood thinners until directed by your doctor. DRESSING CARE *** Does Not Apply  Change dressing / band aids as directed by your doctor. FOLLOW PHONE CALLS * Calls will be made by nursing staff.  If you have any problems or concerns, call your doctor as needed. CALL YOUR DOCTOR IF 
 Excessive bleeding that does not stop after holding mild pressure over the area for 15 minutes  You soak a pad in an hour  Temperature of 101°F or above  Green or yellow, smelly drainage or discharge  You are unable to urinate by bedtime  Nausea and vomiting that does not stop by bedtime AFTER ANESTHESIA  For the next 24 hours: DO NOT Drive, Drink alcoholic beverages, or Make important decisions.  Be aware of dizziness following anesthesia and while taking pain medication.  Plan to stay tonight within 1 hours drive of the hospital. 
 
 
 
 
  
  
  
Introducing [facility]! Salvatore Shore introduces Budge patient portal. Now you can access parts of your medical record, email your doctor's office, and request medication refills online. 1. In your internet browser, go to [email or web address] 2. Click on the First Time User? Click Here link in the Sign In box. You will see the New Member Sign Up page. 3. Enter your BusyEvent Access Code exactly as it appears below. You will not need to use this code after youve completed the sign-up process. If you do not sign up before the expiration date, you must request a new code. · BusyEvent Access Code: [email or web address] Expires: 9/30/2018  1:17 PM 
 
4. Enter the last four digits of your Social Security Number (xxxx) and Date of Birth (mm/dd/yyyy) as indicated and click Submit. You will be taken to the next sign-up page. 5. Create a BusyEvent ID. This will be your BusyEvent login ID and cannot be changed, so think of one that is secure and easy to remember. 6. Create a BusyEvent password. You can change your password at any time. 7. Enter your Password Reset Question and Answer. This can be used at a later time if you forget your password. 8. Enter your e-mail address. You will receive e-mail notification when new information is available in [address] 9. Click Sign Up. You can now view and download portions of your medical record. 10. Click the Download Summary menu link to download a portable copy of your medical information. If you have questions, please visit the Frequently Asked Questions section of the BusyEvent website. Remember, BusyEvent is NOT to be used for urgent needs. For medical emergencies, dial 911. Now available from your iPhone and Android! Introducing Higinio Hoyt As a [facility]ans[facility] Chavez patient, I wanted to make you aware of our electronic visit tool called Higinio Hoyt. [facility] 24/7 allows you to connect within minutes with a medical provider 24 hours a day, seven days a week via a mobile device or tablet or logging into a secure website from your computer. You can access Higinio Hoyt from anywhere in the [place].  
 
A virtual visit might be right for you when you have a simple condition and feel like you just dont want to get out of bed, or cant get away from work for an appointment, when your regular [employer] provider is not available (evenings, weekends or holidays), or when youre out of town and need minor care. Electronic visits cost only $49 and if the [employer] 24/7 provider determines a prescription is needed to treat your condition, one can be electronically transmitted to a nearby pharmacy*. Please take a moment to enroll today if you have not already done so. The enrollment process is free and takes just a few minutes. To enroll, please download the [employer] 24/7 allan to your tablet or phone, or visit [email or web address] to enroll on your computer. And, as an [address] [place] [address] patient with a [employer] account, the results of your visits will be scanned into your electronic medical record and your primary care provider will be able to view the scanned results. We urge you to continue to see your regular [employer] provider for your ongoing medical care. And while your primary care provider may not be the one available when you seek a [employer] virtual visit, the peace of mind you get from getting a real diagnosis real time can be priceless. For more information on "[employer] view our Frequently Asked Questions (FAQs) at [email or web address]. Sincerely, 
 
Tyrell Jesus MD 
Chief Medical Officer Rolando Veronica *:  certain medications cannot be prescribed via "[employer] Providers Seen During Your Hospitalization Provider Specialty Primary office phone Noa Herrera MD Obstetrics & Gynecology [phone] Your Primary Care Physician (PCP) Primary Care Physician Office Phone Office Fax Tomas Bennett [phone] You are allergic to the following No active allergies Recent Documentation OB Status Smoking Status Having regular periods Never Smoker Emergency Contacts Name Discharge Info Relation Home Work Mobile [address] [facility] CAREGIVER [3] Other Relative [6] [phone] Patient Belongings The following personal items are in your possession at time of discharge: 
  Dental Appliances: None Please provide this summary of care documentation to your next provider. Signatures-by signing, you are acknowledging that this After Visit Summary has been reviewed with you and you have received a copy. Patient Signature:  ____________________________________________________________ Date:  ____________________________________________________________  
  
[facility] Provider Signature:  ____________________________________________________________ Date:  ____________________________________________________________

## 2018-09-07 NOTE — BRIEF OP NOTE
BRIEF OPERATIVE NOTE Date of Procedure: 9/7/2018 Preoperative Diagnosis: Pelvic pain in female [R10.2] Postoperative Diagnosis: Pelvic pain in female [R10.2] Procedure(s): LAPAROSCOPY DIAGNOSTIC WITH CO2 LASER and RIGHT OVARIAN CYSTOTOMY Surgeon(s) and Role: * Ronna Almaraz MD - Primary Surgical Assistant: none Surgical Staff: 
Circ-1: Alda Partida RN 
Circ-2: Tyra Lee RN Scrub Tech-1: Vinita Valerio Scrub Tech-2: [employer] Event Time In Incision Start [phone] Incision Close 1143 Anesthesia: General  
Estimated Blood Loss: 25cc Specimens: * No specimens in log * Findings: right ovarian cyst , endometriosis in cul de sac Complications: none Implants: * No implants in log *

## 2018-09-07 NOTE — ANESTHESIA PREPROCEDURE EVALUATION
Anesthetic History No history of anesthetic complications Review of Systems / Medical History Patient summary reviewed and pertinent labs reviewed Pulmonary Within defined limits Neuro/Psych Within defined limits Cardiovascular Within defined limits Exercise tolerance: >4 METS 
  
GI/Hepatic/Renal 
  
GERD: well controlled Endo/Other Obesity Other Findings Physical Exam 
 
Airway Mallampati: II 
TM Distance: 4 - 6 cm Neck ROM: normal range of motion Mouth opening: Normal 
 
 Cardiovascular Regular rate and rhythm,  S1 and S2 normal,  no murmur, click, rub, or gallop Rhythm: regular Rate: normal 
 
 
 
 Dental 
No notable dental hx Pulmonary Breath sounds clear to auscultation Abdominal 
 
 
 
 Other Findings Anesthetic Plan ASA: 2 Anesthesia type: general 
 
 
 
 
Induction: Intravenous Anesthetic plan and risks discussed with: Patient

## 2018-09-07 NOTE — H&P
Subjective:  
 
Patient is a 26 y.o.  female presents with pelvic pain and dysmenotrreha in spite of hormonal treatment. gradually worsening course. Patient Active Problem List  
 Diagnosis Date Noted  Severe obesity (BMI 35.0-39.9) ([address] 08/16/2018  Menstrual bleeding problem 07/19/2018  Pelvic pain in female 07/19/2018 Past Medical History:  
Diagnosis Date  Adverse effect of anesthesia   
 woke up during ankle surgery. had nausea and a migraine after endoscopy  BMI 37.0-37.9, adult  GERD (gastroesophageal reflux disease) omeprazole, zantac daily  Nausea & vomiting   
 post-op nausea Past Surgical History:  
Procedure Laterality Date  HX ENDOSCOPY  2018  HX ORTHOPAEDIC Left   
 ankle reconstruction [unfilled] No Known Allergies Social History Substance Use Topics  Smoking status: Never Smoker  Smokeless tobacco: Never Used  Alcohol use Yes Comment: rare Family History Problem Relation Age of Onset  Cancer Maternal Grandmother Breast, ovarian [facility] Arthritis-osteo Mother  No Known Problems Father Review of Systems A comprehensive review of systems was negative except for that written in the HPI. Objective:  
 
No data found. No intake or output data in the 24 hours ending 09/07/18 0902 General: Alert . Oriented x3 HEENT: Normocephalic PEERL Heart: RRR Lungs: Clear Abdominal: Bowel sounds are normal, liver is not enlarged, spleen is not enlarged Neurological: Alert and oriented X 3, normal strength and tone. Normal symmetric reflexes. Normal coordination and gait Extremities: extremities normal, atraumatic, no cyanosis or edema Assessment:  
 
Pelvic pain Plan:  
 
 
Procedure(s): LAPAROSCOPY DIAGNOSTIC WITH POSS CO2 LASER  The procedure was reviewed in detail as well as the risks of bleeding, infection, DVT and potential surgical complications involving the bladder, ureters, colon or intestines. Also the alternatives,  benefits, recovery and follow-up. All of her questions were answered.

## 2018-09-07 NOTE — DISCHARGE INSTRUCTIONS
INSTRUCTIONS FOLLOWING GYN LAPAROSCOPY      ACTIVITY   Limit activity today; increase activity tomorrow, but no vigorous exercise   Shower only; no tub baths   No douches, tampons or intercourse until your doctor releases you (at least 2 weeks)   May return to work or school as directed by your doctor    DIET   Clear liquids until no nausea or vomiting   Advance to regular diet as tolerated    PAIN   Expect a moderate amount of pain.  Take pain medication as directed by your doctor. If no prescription for pain is sent home with you, take the appropriate dose of your commonly used pain medication.  Call you doctor if pain is NOT relieved by medication.  DO NOT take aspirin or blood thinners until directed by your doctor. DRESSING CARE *** Does Not Apply   Change dressing / band aids as directed by your doctor. FOLLOW PHONE [facility] will be made by nursing staff.  If you have any problems or concerns, call your doctor as needed. CALL YOUR DOCTOR IF   Excessive bleeding that does not stop after holding mild pressure over the area for 15 minutes   You soak a pad in an hour   Temperature of 101°F or above   Green or yellow, smelly drainage or discharge   You are unable to urinate by bedtime   Nausea and vomiting that does not stop by bedtime    AFTER ANESTHESIA   For the next 24 hours: DO NOT Drive, Drink alcoholic beverages, or Make important decisions.  Be aware of dizziness following anesthesia and while taking pain medication.    Plan to stay tonight within 1 hours drive of the hospital.

## 2018-09-07 NOTE — ANESTHESIA POSTPROCEDURE EVALUATION
Post-Anesthesia Evaluation and Assessment Patient: Adore Harmon MRN: [id number]  SSN: [id number] YOB: 1995  Age: 26 y.o. Sex: female Cardiovascular Function/Vital Signs Visit Vitals  /68  Pulse 62  Temp 36.2 °C (97.2 °F)  Resp 16  SpO2 93% Patient is status post general anesthesia for Procedure(s): LAPAROSCOPY DIAGNOSTIC WITH CO2 LASER and RIGHT OVARIAN CYSTOTOMY. Nausea/Vomiting: None Postoperative hydration reviewed and adequate. Pain: 
Pain Scale 1: Visual (09/07/18 1240) Pain Intensity 1: 0 (09/07/18 1240) Managed Neurological Status:  
Neuro (WDL): Within Defined Limits (09/07/18 1240) At baseline Mental Status and Level of Consciousness: Arousable Pulmonary Status:  
O2 Device: Room air (09/07/18 1240) Adequate oxygenation and airway patent Complications related to anesthesia: None Post-anesthesia assessment completed. No concerns Signed By: Stephy Anderson MD   
 September 7, 2018

## 2018-09-11 NOTE — OP NOTES
PREOPERATIVE DIAGNOSIS:  Pelvic pain in female.     POSTOPERATIVE DIAGNOSES:  Pelvic pain in female, endometriosis, right ovarian cyst.     PROCEDURES PERFORMED:  Laparoscopic exam with laser ablation of endometriosis in the posterior cul-de-sac and right ovarian cystotomy.     SURGEON:  Mimi Leija MD     ASSISTANT:  None.     ANESTHESIA:  General.     ESTIMATED BLOOD LOSS:  Less than 30 mL.     SPECIMENS REMOVED:  None.     FINDINGS:  Areas consistent with powder burn posterior cul-de-sac, right ovarian cyst of about 3 cm.     COMPLICATIONS:  None.     IMPLANTS:  None.     DESCRIPTION:  After an adequate level of anesthesia was obtained, the patient was prepped and draped in the usual sterile fashion in the dorsal lithotomy position. An infraumbilical incision made. Veress needle inserted into the peritoneal cavity, insufflating the peritoneal cavity with CO2. Trocar was placed and noted to be within the peritoneal cavity. A 5 mm suprapubic trocar was placed. The uterus had some clear blebs and in the posterior cul-de-sac, there were some powder burn areas consistent with endometriosis. The tubes and ovaries were evaluated and there was noted to be a right ovarian cyst of approximately 3 cm. Otherwise, they appeared normal.  The CO2 laser at 10 of superpulse was used to coagulate the endometriosis in the posterior cul-de-sac and to drain and open the right ovarian cyst, but it had clear straw-colored fluid so it was not sent for pathology. After the laser ablation, the area was irrigated free of blood clot and debris. Hemostasis was noted. The 5 mm suprapubic trocar was removed under visualization. The infraumbilical trocar removed. Deep stitch was placed in the umbilicus, Dermabond on the skin. The patient tolerated the procedure well and went to the recovery room in good condition.     DISPOSITION:  The patient was given a prescription for Percocet 7.5 (#15). She has an appointment in 2 weeks. Otherwise, she is to follow routine postoperative precautions.  MD CHELY Medina / YOLY  D: 09/07/2018 11:58     T: 09/07/2018 13:08  JOB #: [id number]

## 2019-08-22 ENCOUNTER — HOSPITAL ENCOUNTER (OUTPATIENT)
Dept: PHYSICAL THERAPY | Age: 24
Discharge: HOME | End: 2019-08-22
Payer: COMMERCIAL

## 2019-08-22 PROCEDURE — 97161 PT EVAL LOW COMPLEX 20 MIN: CPT

## 2019-08-22 PROCEDURE — 97110 THERAPEUTIC EXERCISES: CPT

## 2019-08-22 NOTE — PROGRESS NOTES
Ebonie Arana  : 1995  Primary: Selvin Palafox [facility] Other  Secondary:  [address]  at [facility]  [address], [address] [facility], [place] [address] [address] [facility]  [phone]   [phone]      OUTPATIENT PHYSICAL THERAPY: Daily Treatment Note 2019  Pre-treatment Symptoms/Complaints:  See today's assessment note  Pain: Initial:   8/10 Post Session:  10/10   Medications Last Reviewed:    Updated Objective Findings:  See evaluation note from today   TREATMENT:   THERAPEUTIC EXERCISE: (8 minutes):  Exercises per grid below to improve mobility, strength and activity tolerance. Required no   cues to promote proper body alignment. Progressed duration as indicated. Date:  19 Date:   Date:     Activity/Exercise Parameters Parameters Parameters   Sustained Ambulation On Track                                           Patient/Family Education:               [x] Fibromyalgia Program Plan of Care and Contract (with pt signature)              [] Benefits of Exercise              [] Pain Management              [] Nutrition              [] Improving sleep              [] Body Mechanics              [] Fatigue              [] Grief/Depression/Stress    [employer] Portal  Treatment/Session Summary:    · Response to Treatment:  Ms. Maximo Carlson experienced increased pain as a result of today's session. She did not verbalize the magnitude of her pain but she did have an elevated BP and admited to her pain level upon questioning. .  · Communication/Consultation:  None today  · Equipment provided today:  None today  · Recommendations/Intent for next treatment session: Next visit will focus on continuing to find an appropriate level of activity to perform during our sessions in order to challenge but not overwhelm Ms. Herrera.   Treatment Plan of Care Effective Dates:  19 to 19  Total Treatment Billable Duration:  8 minutes  PT Patient Time In/Time Out  Time In: 1005  Time Out: Aly Dale    Future Appointments   Date Time Provider Bijan Vanda   8/26/2019 11:00 AM Maricel Holley, PT [facility]   8/29/2019 11:00 AM Arik Rivers [facility]   9/4/2019  8:45 AM Maricel Holley PT [facility]   9/5/2019 11:00 AM Arik Rivers [facility]   9/9/2019 11:00 AM Maricel Holley PT [facility]   9/12/2019 11:00 AM Arik Rivers [facility]   9/16/2019 11:00 AM Maricel Holley PT [facility]   9/19/2019 11:00 AM Arik Rivers [facility]   10/30/2019 10:00 AM Gabby Alvarado MD SSA CMW CMW

## 2019-08-22 NOTE — THERAPY EVALUATION
Gary Boo  : 1995  Primary: Yuval Newman [facility] Other  Secondary:  [address]  at [facility] [address], [address] [facility], [address]  [phone]   [phone]       OUTPATIENT PHYSICAL THERAPY:Initial Assessment 2019   ICD-10: Treatment Diagnosis:   Pain in right arm (M79.601)  Pain in left arm (M79.602)  Pain in right leg (M79.604)  Pain in left leg (M79.605)  Low back pain (M54.5)  Precautions/Allergies: Patient has no known allergies. MD Orders: PT Eval MEDICAL/REFERRING DIAGNOSIS:  Generalized pain [R52]  DATE OF ONSET: Chronic  REFERRING PHYSICIAN: Chuyita Frost MD     INITIAL ASSESSMENT:  Ms. Panfilo Ferguson presents today with generalized pain that has been getting worse. In relation to her mobility and tolerance to perform activities of daily living she would benefit from a bout of physical therapy to increase her tolerance to sustained low level activities such as walking, lifting, bending, and reaching. Our plan is to incorporate education, practice, and activities to challenge and grow her tolerance to perform these activities. PROBLEM LIST (Impacting functional limitations):  1. Decreased Strength  2. Decreased ADL/Functional Activities  3. Increased Pain  4. Decreased Activity Tolerance  5. Decreased Pacing Skills  6. Increased Fatigue  7. Decreased Flexibility/Joint Mobility  8. Decreased Knowledge of Precautions  9. Decreased [place] with Home Exercise Program INTERVENTIONS PLANNED: (Treatment may consist of any combination of the following)  1. Aquatic Therapy  2. Balance Exercise  3. Electrical Stimulation (E-Stim)  4. Gait Training  5. Heat  6. Home Exercise Program (HEP)  7. Manual Therapy  8. Therapeutic Activities  9. Therapeutic Exercise  10. Transcutaneous Electrical Nerve Stimulation (TENS)   TREATMENT PLAN:  Effective Dates: 2019 TO 2019 (90 days).   Frequency/Duration: 2 times a week for up to 16 treatment sessions. GOALS: (Goals have been discussed and agreed upon with patient.)  Discharge Goals: Time Frame: 8 Weeks STATUS: (.goalstatus) 1. Ms. Elise Jon will be independent with her HEP. Ongoing 2. Ms. Elise Jon will score 60 or below on the Fibromyalgia Impact Questionnaire. Ongoing 3. Ms. Elise Jon will be able to walk greater than 1600 feet in 6 minutes while reporting a pain score of 6/10 or lower. Ongoing         OUTCOME MEASURE:   Tool Used: 6-MINUTE WALK TEST  Score:  Initial: 1650 feet (10/10 pain after /110) Most Recent: X feet (Date: -- )   Interpretation of Score: Normal range varies but is approximately 0602-9766 Feet    Tool Used: Fibromyalgia Impact Questionnaire  Initial Score:  73.59 Severe Impact Date:  8/23/2019   Interpretation of Score: <40=Mild, 40-60=Moderate, >60-70=Severe Fibromyalgia Syndrome    MEDICAL NECESSITY:   · Patient demonstrates good rehab potential due to higher previous functional level. REASON FOR SERVICES/OTHER COMMENTS:  · Patient continues to require modification of therapeutic interventions to increase complexity of exercises  Total Duration:  PT Patient Time In/Time Out  Time In: 1005  Time Out: 1100    Rehabilitation Potential For Stated Goals: Good  Regarding Yuriy Herrera's therapy, I certify that the treatment plan above will be carried out by a therapist or under their direction. 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   Past Medical History/Comorbidities:   Ms. Elise Jon  has a past medical history of Adverse effect of anesthesia, BMI 37.0-37.9, adult, Endometriosis, GERD (gastroesophageal reflux disease), IBS (irritable bowel syndrome), and Nausea & vomiting. She also has no past medical history of Aneurysm ([address] Arrhythmia, Arthritis, Asthma, Autoimmune disease ([address] CAD (coronary artery disease), Cancer ([address] Chronic kidney disease, Chronic obstructive pulmonary disease ([address] Chronic pain, Coagulation disorder ([address] Diabetes ([address] Difficult intubation, Endocarditis, Heart failure ([address] Hypertension, Ill-defined condition, Liver disease, Malignant hyperthermia due to anesthesia, Nicotine vapor product user, Non-nicotine vapor product user, Pseudocholinesterase deficiency, Psychiatric disorder, PUD (peptic ulcer disease), Rheumatic fever, Seizures ([address] Sleep apnea, Stroke ([address] Thromboembolus ([address] or Thyroid disease. Ms. Paulie Orozco  has a past surgical history that includes hx orthopaedic (Left); hx endoscopy (2018); and hx abdominal laparoscopy. Social History/Living Environment:   Ms. Paulie Orozco lives with the following: parents, brother, grandmother. Ms. Paulie Orozco has one flight of stairs to navigate at home. Prior Level of Function/Work/Activity:  Ms. 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 Ambulatory/Rehab Services H2 Model Falls Risk Assessment   Risk Factors:       (4)  Confusion/Disorientation/Impulsivity       (1)  Dizziness/Vertigo       (1)  Visual Impairment [specify:  glasses] Ability to Rise from Chair:       (0)  Ability to rise in a single movement   Falls Prevention Plan:       No modifications necessary   Total: (5 or greater = High Risk): 6   ©2010 [facility] of [employer]. All Rights Reserved. [facility] States Patent #[id number]. Federal Law prohibits the replication, distribution or use without written permission from [facility] [employer]   Current Medications:       Current Outpatient Medications:     norethindrone acetate (AYGESTIN) 5 mg tablet, Take 1 Tab by mouth daily. , Disp: 30 Tab, Rfl: 12    elagolix (ORILISSA) 200 mg tab, Take 1 Tab by mouth two (2) times a day., Disp: 60 Tab, Rfl: 12    DULoxetine (CYMBALTA) 30 mg capsule, Take 30 mg by mouth., Disp: , Rfl:     gabapentin (NEURONTIN) 100 mg capsule, TAKE ONE CAPSULE BY MOUTH TWICE A DAY, Disp: , Rfl: 1    B.infantis-B.ani-B.long-B.bifi (PROBIOTIC 4X) 10-15 mg TbEC, Take 1 Tab by mouth daily. , Disp: , Rfl:     ondansetron hcl (ZOFRAN) 4 mg tablet, TAKE ONE TABLET BY MOUTH EVERY 6 HOURS AS NEEDED FOR NAUSEA AND VOMITING, Disp: , Rfl: 1    hyoscyamine SL (LEVSIN/SL) 0.125 mg SL tablet, 1 Tab by SubLINGual route every four (4) hours as needed for Cramping., Disp: 20 Tab, Rfl: 0   Date Last Reviewed:  8/22/2019   Number of Personal Factors/Comorbidities that affect the Plan of Care: 1-2: MODERATE COMPLEXITY   EXAMINATION:   Initial assessment on 8/22/2019  ROM:          Ms. Natalya De Santiago has normal ROM generally but with increased pain in all planes with testing. Strength:   Upper Extremities: 4-/5 generally (with pain)  Lower Extremities: 4-/5 generally (with pain)  Neurological Screen:        Ms. Natalya De Santiago reports parasthesias bilaterally that begin at her L5 spine and eminate thru her buttocks and lateral thighs. Otherwise her sensation is intact. Functional Mobility:         Gait/Ambulation:  Pt ambulates with following gait deficits: decreased trunk rotation and wide JEAN-PAUL         Transfers:  Independent        Bed Mobility:  Independent  Balance:          good in sitting, good in standing   Body Structures Involved:  1. Joints  2. Muscles  3. Ligaments Body Functions Affected:  1. Sensory/Pain  2. Neuromusculoskeletal  3. Movement Related Activities and Participation Affected:  1. General Tasks and Demands  2. Mobility  3. Self Care  4. Domestic Life  5. Interpersonal Interactions and Relationships  6.  Community, Social and [facility] [address]   Number of elements (examined above) that affect the Plan of Care: 4+: HIGH COMPLEXITY   CLINICAL PRESENTATION:   Presentation: Stable and uncomplicated: LOW COMPLEXITY   CLINICAL DECISION MAKING:   Use of outcome tool(s) and clinical judgement create a POC that gives a: Clear prediction of patient's progress: LOW COMPLEXITY

## 2019-08-26 ENCOUNTER — HOSPITAL ENCOUNTER (OUTPATIENT)
Dept: PHYSICAL THERAPY | Age: 24
Discharge: HOME | End: 2019-08-26
Payer: COMMERCIAL

## 2019-08-26 PROCEDURE — 97110 THERAPEUTIC EXERCISES: CPT

## 2019-08-26 NOTE — PROGRESS NOTES
Casey Poe  : 1995  Primary: Ethelda Lefort [facility] Other  Secondary:  [address]  at [facility], [place], [facility] [address] [facility]  [phone]   [phone]      OUTPATIENT PHYSICAL THERAPY: Daily Treatment Note 2019  Pre-treatment Symptoms/Complaints:  Ms. Rosie Barron reported that she was very active this weekend and is still painful, stiff, and sore today. She reports having participated in a high school reunion including playing some basketball and has had a stressful morning at work today. Pain: Initial:   8.5/10 Post Session:  7/10   Medications Last Reviewed:    Updated Objective Findings:  None Today   TREATMENT:   THERAPEUTIC EXERCISE: (40 minutes):  Exercises per grid below to improve mobility, strength and activity tolerance. Required no   cues to promote proper body alignment. Progressed duration as indicated. Date:  19 Date:  19 Date:     Activity/Exercise Parameters Parameters Parameters   Sustained Ambulation On Track     NuStep/SciFit  6 min L1, 4 min L2    Prone Lumbar Stretching  3 stages x3 held for 30 sec    LTR  x15 w/3 sec hold    Hamstring Stretching  3 x 30 sec while sitting edge of mat    Bridging      SLR      Moist Heat    10 minutes in prone at end of session                  Patient/Family Education:               [x] Fibromyalgia Program Plan of Care and Contract (with pt signature)              [x] Benefits of Exercise              [] Pain Management              [] Nutrition              [] Improving sleep              [] Body Mechanics              [] Fatigue              [] Grief/Depression/Stress    [employer] Portal  Treatment/Session Summary:    · Response to Treatment:  Ms. Rosie Barron successfully participated in todays exercises and accurately reported her body's responce during this session. Ms. Rosie Barron reported feeling \"looser\" after today's session with less pain. .  · Communication/Consultation: None today  · Equipment provided today:  None today  · Recommendations/Intent for next treatment session: Next visit will focus on continuing to find an appropriate level of activity to perform during our sessions in order to challenge but not overwhelm Ms. Herrera.   Treatment Plan of Care Effective Dates:  8/22/19 to 11/20/19  Total Treatment Billable Duration:  8 minutes     Alisha Baker, PT    Future Appointments   Date Time Provider Bijan Dc   8/26/2019 11:00 AM Ronda Vasquez, PT [facility]   8/29/2019 11:00 AM Maryjane Gann [facility]   9/4/2019  8:45 AM Ronda Vasquez, PT [facility]   9/5/2019 11:00 AM Maryjane Gann PTA [facility]   9/9/2019 11:00 AM Ronda Vasquez, PT [facility]   9/12/2019 11:00 [facility]   9/16/2019 11:00 AM Ronda Vasquez, PT [facility]   9/19/2019 11:00 AM Maryjane Gann [facility] SFD   10/30/2019 10:00 AM MD SONU Adams CMW CMW

## 2019-08-29 ENCOUNTER — HOSPITAL ENCOUNTER (OUTPATIENT)
Dept: PHYSICAL THERAPY | Age: 24
Discharge: HOME | End: 2019-08-29
Payer: COMMERCIAL

## 2019-08-29 PROCEDURE — 97113 AQUATIC THERAPY/EXERCISES: CPT

## 2019-08-29 NOTE — PROGRESS NOTES
Wali Danielson  : 1995  Primary: Darrin Urias [facility] Other  Secondary:  [address]  at [facility]  [address], [place], [facility] [address] [facility]  [phone]   [phone]      OUTPATIENT PHYSICAL THERAPY: Daily Treatment and Aquatic Note 2019  Pre-treatment Symptoms/Complaints:  Ms. Seamus Brown reported that she had a rough night. She reports pain all over to be 7.5/10. Pain: Initial:   7.5/10 Post Session:  6/10   Medications Last Reviewed:    Updated Objective Findings:  None Today   TREATMENT:   Aquatic Therapy (45 minutes): Aquatic treatment performed per flow grid for Decreased muscle strength, Decreased endurance and Decreased activity endurance. Cues provided for posture and to slow down and breathe. Aquatic Exercise Log       Date  19 Date   Date   Date     Activity/ Exercise Parameters Parameters Parameters Parameters   Walking forward 6 laps       Walking backward 6 laps       Walking sideways 6 laps       Marching 4 laps      Goose Step 4 laps       Tip toes 3 laps       Heels 3 laps       Lunges       Side step squats       LE Exercises No noodle       Hip Flex/Ext X 10 B      Hip Abd/Add X 10 B      Hip IR/ER       Calf raises X 10 B      Knee Flex       Squats       Leg Circles 10/10 B      Step Ups X 10 B      UE Exercises Small noodle       Squeeze In X 10 B      Push Down X 10 B      Pull Down X 10 B      Bicep/Tricep       Rows/Press outs        Chi Positions       Trunk Rotation       Deep H2O/ Noodles rings      Stabilization       Arms only       Legs only Bicycle - 2 min       [place] 2 min       Scissors 2 min       Crab walk       Lower abdominal   work  DKTC - x 25       Cardio       Jogging       Lap Swimming       Stretches       Hamstrings       Heelcords       Piriformis       Neck                  [employer] Portal  Treatment/Session Summary:  Pain decreased slightly. She did well with her first aquatic session. · Response to Treatment:  Ms. Lizzie Sprague successfully participated in her aquatic session with good response from her body on less pain and more relaxed. .  · Communication/Consultation:  None today  · Equipment provided today:  None today  · Recommendations/Intent for next treatment session: Next visit will focus on continuing to find an appropriate level of activity to perform during our sessions in order to challenge but not overwhelm Ms. Herrera.   Treatment Plan of Care Effective Dates:  8/22/19 to 11/20/19  Total Treatment Billable Duration:  45 minutes  PT Patient Time In/Time Out  Time In: 1100  Time Out: [address] [facility], \[facility]\""    Future Appointments   Date Time Provider Bijan Dc   9/4/2019  8:45 AM Minna Sheriff, BETTIE [facility]   9/5/2019 11:00 AM Sarai Castro PTA SFLUC[facility]   9/9/2019 11:00 AM BETTIE Moreno [facility]   9/12/2019 11:00 AM SHARA Saldivar D   9/16/2019 11:00 AM BETTIE Moreno D   9/19/2019 11:00 AM Sarai Castro PTA [facility] SFD   10/30/2019 10:00 AM MD SONU Lynch CMW CMW

## 2019-09-04 ENCOUNTER — HOSPITAL ENCOUNTER (OUTPATIENT)
Dept: PHYSICAL THERAPY | Age: 24
Discharge: HOME | End: 2019-09-04
Payer: COMMERCIAL

## 2019-09-04 PROCEDURE — 97110 THERAPEUTIC EXERCISES: CPT

## 2019-09-04 NOTE — PROGRESS NOTES
Martita Reyes  : 1995  Primary: Santiago Clemons [facility] Other  Secondary:  [address]  at [facility] [address], [address], [place], [address] [facility]  [phone]   [phone]      OUTPATIENT PHYSICAL THERAPY: Daily Treatment Note 2019  Pre-treatment Symptoms/Complaints:  Ms. Faraz Ahmadi reported that she was very active this weekend and is still painful, stiff, and sore today. She reports having participated in a high school reunion including playing some basketball and has had a stressful morning at work today. Pain: Initial:   8.5/10 Post Session:  7/10   Medications Last Reviewed:    Updated Objective Findings:  None Today   TREATMENT:   THERAPEUTIC EXERCISE: (40 minutes):  Exercises per grid below to improve mobility, strength and activity tolerance. Required no   cues to promote proper body alignment. Progressed duration as indicated.    Date:  19 Date:  19 Date:  14   Activity/Exercise Parameters Parameters Parameters   Sustained Ambulation On Track     NuStep/SciFit  6 min L1, 4 min L2 1 min L1, 12 min L1.5   Prone Lumbar Stretching  3 stages x3 held for 30 sec 3 stages x 2 held for 30 sec   LTR  x15 w/3 sec hold    Hamstring Stretching  3 x 30 sec while sitting edge of mat 3 x 30 sec while sitting edge of mat   Bridging      SLR      Moist Heat    10 minutes in prone at end of session    Cold Therapy   10 minutes in prone at end of session           Patient/Family Education:               [x] Fibromyalgia Program Plan of Care and Contract (with pt signature)              [x] Benefits of Exercise              [x] Pain Management              [] Nutrition              [] Improving sleep              [] Body Mechanics              [] Fatigue              [] Grief/Depression/Stress    [employer] Portal  Treatment/Session Summary:    · Response to Treatment:  Ms. Faraz Ahmadi successfully participated in todays exercises and accurately reported her body's responce during this session. Ms. Seamus Brown reported feeling \"looser\" after today's session with no increase in pain. .  · Communication/Consultation:  None today  · Equipment provided today:  None today  · Recommendations/Intent for next treatment session: Next visit will focus on continuing to find an appropriate level of activity to perform during our sessions in order to challenge but not overwhelm Ms. Herrera.   Treatment Plan of Care Effective Dates:  8/22/19 to 11/20/19  Total Treatment Billable Duration:  8 minutes  PT Patient Time In/Time Out  Time In: 0845  Time Out: [address], PT    Future Appointments   Date Time Provider Bijan Dc   9/5/2019 11:00 AM Francine Velazquez [facility]   9/9/2019 11:00 AM BETTIE VillalpandoJORGE [facility]   9/12/2019 11:00 AM Francine Velazquez [facility]   9/16/2019 11:00 AM BETTIE Villalpando [facility]   9/19/2019 11:00 AM Francine Velazquez [facility]   10/30/2019 10:00 AM MD SONU Pappas CMW CMW

## 2019-09-05 ENCOUNTER — HOSPITAL ENCOUNTER (OUTPATIENT)
Dept: PHYSICAL THERAPY | Age: 24
Discharge: HOME | End: 2019-09-05
Payer: COMMERCIAL

## 2019-09-05 PROCEDURE — 97113 AQUATIC THERAPY/EXERCISES: CPT

## 2019-09-05 NOTE — PROGRESS NOTES
Jennifer Dire  : 1995  Primary: Luiz Rosa [facility] Other  Secondary:  [address]  at [facility]  [address], [address], [place], [address] [facility]  [phone]   [phone]      OUTPATIENT PHYSICAL THERAPY: Daily Treatment and Aquatic Note 2019  Pre-treatment Symptoms/Complaints:  Ms. Marleni Jacobo reported that it has been a rough week. Pain: Initial:   9/10 in her back today Post Session:  7/10   Medications Last Reviewed:    Updated Objective Findings:  None Today   TREATMENT:   Aquatic Therapy (45 minutes): Aquatic treatment performed per flow grid for Decreased muscle strength, Decreased endurance and Decreased activity endurance. Cues provided for posture and to slow down and breathe.       Aquatic Exercise Log       Date  19 Date  19 Date   Date     Activity/ Exercise Parameters Parameters Parameters Parameters   Walking forward 6 laps  4     Walking backward 6 laps  4     Walking sideways 6 laps  4     Marching 4 laps 4     Goose Step 4 laps  4     Tip toes 3 laps       Heels 3 laps       Lunges       Side step squats       LE Exercises No noodle  No noodle      Hip Flex/Ext X 10 B X 10 B     Hip Abd/Add X 10 B X 10 B     Hip IR/ER       Calf raises X 10 B X 10 B     Knee Flex       Squats       Leg Circles 10/10 B 10/10 B     Step Ups X 10 B Small x 10 B     UE Exercises Small noodle       Squeeze In X 10 B      Push Down X 10 B      Pull Down X 10 B      Bicep/Tricep       Rows/Press outs        Chi Positions       Trunk Rotation       Deep H2O/ Noodles rings Seated at bench     Stabilization       Arms only       Legs only Bicycle - 2 min  Bicycle - 2 min      [place] 2 min  1 min      Scissors 2 min  1 min      Crab walk       Lower abdominal   work  DKTC - x 25  Clams - x 25  Reverse clams x 25      Cardio       Jogging       Lap Swimming       Stretches       Hamstrings       Heelcords       Piriformis       Neck [employer] Portal  Treatment/Session Summary:  Patient took several rest breaks today. We did not enter into the 7 ft today. No episodes of nausea or pain upon departure. · Response to Treatment:  Ms. Zoe Roman successfully participated in her aquatic session with better results today. .  · Communication/Consultation:  None today  · Equipment provided today:  None today  · Recommendations/Intent for next treatment session: Next visit will focus on continuing to find an appropriate level of activity to perform during our sessions in order to challenge but not overwhelm Ms. Herrera.   Treatment Plan of Care Effective Dates:  8/22/19 to 11/20/19  Total Treatment Billable Duration:  45 minutes  PT Patient Time In/Time Out  Time In: 1100  Time Out: [address] [facility], [facility]    Future Appointments   Date Time Provider Bijan Dc   9/9/2019 11:00 AM Mary Harrell, PT [facility]   9/12/2019 11:00 AM Ozzie Media, [facility]   9/16/2019 11:00 AM Mary Harrell PT [facility]   9/19/2019 11:00 AM Ozzie Media, [facility]   10/30/2019 10:00 AM Stanislav Dunne MD [facility] CMW CMW

## 2019-09-09 ENCOUNTER — HOSPITAL ENCOUNTER (OUTPATIENT)
Dept: PHYSICAL THERAPY | Age: 24
Discharge: HOME | End: 2019-09-09
Payer: COMMERCIAL

## 2019-09-09 PROCEDURE — 97014 ELECTRIC STIMULATION THERAPY: CPT

## 2019-09-09 PROCEDURE — 97110 THERAPEUTIC EXERCISES: CPT

## 2019-09-09 PROCEDURE — 97140 MANUAL THERAPY 1/> REGIONS: CPT

## 2019-09-09 NOTE — PROGRESS NOTES
Andrea Madrigal  : 1995  [facility] Other  Secondary:  [address]  [facility] [facility], [place] [address] [address] [facility]  [phone]   [phone]      OUTPATIENT PHYSICAL THERAPY: Daily Treatment Note 2019  Pre-treatment Symptoms/Complaints:  Ms. Marko Zaldivar reported that she was very active this weekend (helped a client move into an apartment) and has had a stressful morning at work today. Pain: Initial:   9/10 lumbar pain. Janie Larry Janie Larry 6/10 generally Post Session:  /10   Medications Last Reviewed:    Updated Objective Findings:  None Today   TREATMENT:   THERAPEUTIC EXERCISE: (30 minutes):  Exercises per grid below to improve mobility, strength and activity tolerance. Required no   cues to promote proper body alignment. Progressed duration as indicated. MANUAL THERAPY: (10 minutes): Soft tissue mobilization was utilized and necessary because of the patient's restricted joint motion and painful spasm. MODALITIES: (45 minutes): *  Electrical Stimulation Therapy (TENS) was provided with intensity adjusted throughout treatment to patient tolerance.       Date:  19 Date:  19 Date:  19 Date:  19   Activity/Exercise Parameters Parameters Parameters    Sustained Ambulation On Track      NuStep/SciFit  6 min L1, 4 min L2 1 min L1, 12 min L1.5 14 min L1.5   Prone Lumbar Stretching  3 stages x3 held for 30 sec 3 stages x 2 held for 30 sec    LTR  x15 w/3 sec hold  x15 w/3 sec hold   Hamstring Stretching  3 x 30 sec while sitting edge of mat 3 x 30 sec while sitting edge of mat    Bridging    W/knee wedge and angel luis roll   SLR       Moist Heat    10 minutes in prone at end of session     Cold Therapy   10 minutes in prone at end of session 10 minutes in prone at end of session   Cervical Traction    1x45 seconds   TENS    To L5 targeted area x 45 minutes     Patient/Family Education:               [x] Fibromyalgia Program Plan of Care and Contract (with pt signature)              [x] Benefits of Exercise              [x] Pain Management              [] Nutrition              [x] Improving sleep              [] Body Mechanics              [] Fatigue              [] Grief/Depression/Stress    [employer] Portal  Treatment/Session Summary:    · Response to Treatment:  Ms. Danya Moreland successfully participated in todays exercises and accurately reported her body's responce during this session. Ms. Danya Moreland reported feeling \"looser\" after today's session with no increase in pain. .  · Communication/Consultation:  None today  · Equipment provided today:  None today  · Recommendations/Intent for next treatment session: Next visit will focus on continuing to find an appropriate level of activity to perform during our sessions in order to challenge but not overwhelm Ms. Herrera.   Treatment Plan of Care Effective Dates:  8/22/19 to 11/20/19  Total Treatment Billable Duration:  8 minutes     Emily Perdomo PT    Future Appointments   Date Time Provider Bijan Dc   9/12/2019 11:00 AM Carmelita Vee PTA [facility]FD   9/16[facility]2019 11:[facility]   9/19/2019 11:00 AM Carmelita Vee [facility] SFD   10/30/2019 10:00 AM Jenny Limon MD [facility] CMW CMW

## 2019-09-12 ENCOUNTER — HOSPITAL ENCOUNTER (OUTPATIENT)
Dept: PHYSICAL THERAPY | Age: 24
Discharge: HOME | End: 2019-09-12
Payer: COMMERCIAL

## 2019-09-12 PROCEDURE — 97113 AQUATIC THERAPY/EXERCISES: CPT

## 2019-09-12 NOTE — PROGRESS NOTES
Gary Boo  : 1995  Primary: Yuval [facility] Other  Secondary:  [address]  at [facility]  [address], [address] [place] [address] Drive, [place], [facility] [address] [facility]  [phone]   [phone]      OUTPATIENT PHYSICAL THERAPY: Daily Treatment and Aquatic Note 2019  Pre-treatment Symptoms/Complaints:  Ms. Panfilo Ferguson reports that she is about the same. Pain: Initial:  -7/10. Post Session:  7/10   Medications Last Reviewed:    Updated Objective Findings:  None Today   TREATMENT:   Aquatic Therapy (45 minutes): Aquatic treatment performed per flow grid for Decreased muscle strength, Decreased endurance and Decreased activity endurance. Cues provided for posture and to slow down and breathe.       Aquatic Exercise Log       Date  19 Date  19 Date  19 Date     Activity/ Exercise Parameters Parameters Parameters Parameters   Walking forward 6 laps  4 6    Walking backward 6 laps  4 6    Walking sideways 6 laps  4 6    Marching 4 laps 4 6    Goose Step 4 laps  4 6    Tip toes 3 laps   2    Heels 3 laps   2    Lunges       Side step squats       LE Exercises No noodle  No noodle  No noodle     Hip Flex/Ext X 10 B X 10 B X 12 B    Hip Abd/Add X 10 B X 10 B X 12 B    Hip IR/ER       Calf raises X 10 B X 10 B X 12 B    Knee Flex   X 12 B    Squats   X 15     Leg Circles 10/10 B 10/10 B 10/10 B    Step Ups X 10 B Small x 10 B Small x 10 B    UE Exercises Small noodle       Squeeze In X 10 B      Push Down X 10 B      Pull Down X 10 B      Bicep/Tricep       Rows/Press outs        Chi Positions       Trunk Rotation       Deep H2O/ Noodles rings Seated at bench Seated at bench     Stabilization       Arms only       Legs only Bicycle - 2 min  Bicycle - 2 min  Bicycle - 2 min     [place] 2 min  1 min  1 min     Scissors 2 min  1 min  1 min     Crab walk       Lower abdominal   work  DKTC - x 25  Clams - x 25  Reverse clams x 25      Cardio       Jogging       Lap Swimming Stretches       Hamstrings       Heelcords       Piriformis       Neck                  [employer] Portal  Treatment/Session Summary:  Patient took several rest breaks today. We did not enter into the 7 ft again today. One episode of dizziness noted today. · Response to Treatment:  Ms. Fabby Wood successfully participated in her aquatic session with better results today. .  · Communication/Consultation:  None today  · Equipment provided today:  None today  · Recommendations/Intent for next treatment session: Next visit will focus on continuing to find an appropriate level of activity to perform during our sessions in order to challenge but not overwhelm Ms. Herrera.   Treatment Plan of Care Effective Dates:  8/22/19 to 11/20/19  Total Treatment Billable Duration:  45 minutes  PT Patient Time In/Time Out  Time In: 1100  Time Out: [address] [facility], [facility]    Future Appointments   Date Time Provider Bijan Dc   9/16/2019 11:00 AM Debbie Nelson PT [facility]   9/19/2019 11:00 AM Savage Porter PTA [facility] SFD   10/30/2019 10:00 AM MD SONU Mckenzie CMW CMW

## 2019-09-16 ENCOUNTER — HOSPITAL ENCOUNTER (OUTPATIENT)
Dept: PHYSICAL THERAPY | Age: 24
Discharge: HOME | End: 2019-09-16
Payer: COMMERCIAL

## 2019-09-16 PROCEDURE — 97014 ELECTRIC STIMULATION THERAPY: CPT

## 2019-09-16 PROCEDURE — 97110 THERAPEUTIC EXERCISES: CPT

## 2019-09-16 NOTE — PROGRESS NOTES
Jennifer Dire  : 1995  Primary: Luiz Rosa [facility] Other  Secondary:  [address]  at [facility]  [address], [address] [facility] [address] [address], [place], [facility] [address] [facility]  [phone]   [phone]      OUTPATIENT PHYSICAL THERAPY: Daily Treatment Note 2019  Pre-treatment Symptoms/Complaints:  Ms. Marleni Jaocbo reported that she was very active this weekend (helped a client move into an apartment) and has had a stressful morning at work today. Pain: Initial:   9/10 lumbar pain. Fede Worrell 6/10 generally Post Session:  /10   Medications Last Reviewed:    Updated Objective Findings:  None Today   TREATMENT:   THERAPEUTIC EXERCISE: (45 minutes):  Exercises per grid below to improve mobility, strength and activity tolerance. Required no   cues to promote proper body alignment. Progressed duration as indicated. MANUAL THERAPY: (0 minutes): Soft tissue mobilization was utilized and necessary because of the patient's restricted joint motion and painful spasm. MODALITIES: (45 minutes): *  Electrical Stimulation Therapy (TENS) was provided with intensity adjusted throughout treatment to patient tolerance. Date:  19 Date:  19 Date:  19 Date:  19   Activity/Exercise Parameters Parameters     Sustained Ambulation       NuStep/SciFit 6 min L1, 4 min L2 1 min L1, 12 min L1.5 14 min L1.5 NuStep L2/5 min  L3/5 min   Prone Lumbar Stretching 3 stages x3 held for 30 sec 3 stages x 2 held for 30 sec     LTR x15 w/3 sec hold  x15 w/3 sec hold    Hamstring Stretching 3 x 30 sec while sitting edge of mat 3 x 30 sec while sitting edge of mat     Bridging   W/knee wedge and angel lusi roll    SLR       Moist Heat   10 minutes in prone at end of session      Cold Therapy  10 minutes in prone at end of session 10 minutes in prone at end of session    Penny Disc/Clock Exercises    Anterior/Posterior 45 sec x 2 reps. Clock wise rotations x 2 min. CCW x 2 min.    Theraball Exercises:     * Contralateral Francisco     * Theraband shoulder IR/ER    ------ w/ largest ball -----  * 15 reps (w/increased Lumbar pain)  * 3 sets/10 reps of B IR and 3 sets of B ER   Cervical Traction   1x45 seconds    TENS   To L5 targeted area x 45 minutes To L5 targeted area x 45 minutes  Patient controlled intensity. Patient/Family Education:               [x] Fibromyalgia Program Plan of Care and Contract (with pt signature)              [x] Benefits of Exercise              [x] Pain Management              [] Nutrition              [x] Improving sleep              [] Body Mechanics              [] Fatigue              [] Grief/Depression/Stress    [employer] Portal  Treatment/Session Summary:    · Response to Treatment:  Ms. Josef Wick successfully participated in todays exercises and accurately reported her body's responce during this session. Ms. Josef Wick reported feeling \"looser\" after today's session with no increase in pain. .  · Communication/Consultation:  None today  · Equipment provided today:  None today  · Recommendations/Intent for next treatment session: Next visit will focus on continuing to find an appropriate level of activity to perform during our sessions in order to challenge but not overwhelm Ms. Herrera.   Treatment Plan of Care Effective Dates:  8/22/19 to 11/20/19  Total Treatment Billable Duration:  8 minutes  PT Patient Time In/Time Out  Time In: 1100  Time Out: [address] Raffy Mares PT    Future Appointments   Date Time Provider Bijan Dc   9/19/2019 11:00 AM Jose Hassan PTA [facility]   10/30/2019 10:00 AM MD SONU Richardson CMW CMW

## 2019-09-26 ENCOUNTER — HOSPITAL ENCOUNTER (OUTPATIENT)
Dept: PHYSICAL THERAPY | Age: 24
Discharge: HOME | End: 2019-09-26
Payer: COMMERCIAL

## 2019-09-26 PROCEDURE — 97113 AQUATIC THERAPY/EXERCISES: CPT

## 2019-09-26 NOTE — PROGRESS NOTES
Arleth Esparza  : 1995  Primary: [facility] Other  Secondary:  [address]  at [facility]  [address], [address], [place], [facility] [address] [facility]  [phone]   [phone]      OUTPATIENT PHYSICAL THERAPY: Daily Treatment and Aquatic Note 2019  Pre-treatment Symptoms/Complaints:  Ms. Elise Jon reports that she was out of [place] last [place]. .  She states she is sore from all the walking. Pain: Initial:  6/10. Post Session:  6/10   Medications Last Reviewed:    Updated Objective Findings:  None Today   TREATMENT:   Aquatic Therapy (45 minutes): Aquatic treatment performed per flow grid for Decreased muscle strength, Decreased endurance and Decreased activity endurance. Cues provided for posture and to slow down and breathe.       Aquatic Exercise Log       Date  19 Date  19 Date  19 Date  19   Activity/ Exercise Parameters Parameters Parameters Parameters   Walking forward 6 laps  4 6 6   Walking backward 6 laps  4 6 6   Walking sideways 6 laps  4 6 6   Marching 4 laps 4 6 6   Goose Step 4 laps  4 6 6   Tip toes 3 laps   2 3   Heels 3 laps   2 3   Lunges       Side step squats       LE Exercises No noodle  No noodle  No noodle  Small noodle    Hip Flex/Ext X 10 B X 10 B X 12 B Marching X 10 B   Hip Abd/Add X 10 B X 10 B X 12 B X 10 B   Hip IR/ER       Calf raises X 10 B X 10 B X 12 B    Knee Flex   X 12 B X 15 B   Squats   X 15     Leg Circles 10/10 B 10/10 B 10/10 B 10/10 B    Step Ups X 10 B Small x 10 B Small x 10 B Big step   X 10 b   UE Exercises Small noodle    Small noodle    Squeeze In X 10 B   X 10 B   Push Down X 10 B   X 10 B   Pull Down X 10 B   X 10 B   Bicep/Tricep    X 10 B   Rows/Press outs        Chi Positions       Trunk Rotation       Deep H2O/ Noodles rings Seated at bench Seated at bench  Seated at bench    Stabilization       Arms only    Seated on noodle - legs only 4 laps arms only 4 laps    Legs only Bicycle - 2 min Bicycle - 2 min  Bicycle - 2 min  Bicycle - 2 min    [facility] 2 min  1 min  1 min  1 min    Scissors 2 min  1 min  1 min  1 min    Crab walk       Lower abdominal   work  DKTC - x 25  Clams - x 25  Reverse clams x 25   Clams x 25   Reverse clams x 25    Cardio    Squats bringing legs up to chest on noodle   X 5    Jogging       Lap Swimming       Stretches       Hamstrings       Heelcords       Piriformis       Neck                  [employer] Portal  Treatment/Session Summary:  Patient reported the beginning of one little episode, but eased with rest.   · Response to Treatment:  Ms. Natalya De Santiago successfully participated in her aquatic session with better results today. .  · Communication/Consultation:  None today  · Equipment provided today:  None today  · Recommendations/Intent for next treatment session: Next visit will focus on continuing to find an appropriate level of activity to perform during our sessions in order to challenge but not overwhelm Ms. Herrera.   Treatment Plan of Care Effective Dates:  8/22/19 to 11/20/19  Total Treatment Billable Duration:  45 minutes  PT Patient Time In/Time Out  Time In: 1100  Time Out: [address], [facility]    Future Appointments   Date Time Provider Bijan Vanda   9/27/2019  8:45 AM Tawanda Bruce, BETT[facility] S[facility]   10/30/2019 10:00 AM Vicente Hendrix MD SSA CMW CMW

## 2019-09-27 ENCOUNTER — HOSPITAL ENCOUNTER (OUTPATIENT)
Dept: PHYSICAL THERAPY | Age: 24
Discharge: HOME | End: 2019-09-27
Payer: COMMERCIAL

## 2019-09-27 PROCEDURE — 97110 THERAPEUTIC EXERCISES: CPT

## 2019-09-27 PROCEDURE — 97140 MANUAL THERAPY 1/> REGIONS: CPT

## 2019-09-27 NOTE — PROGRESS NOTES
Vinita Zhang  : 1995  Primary: [facility] Other  Secondary:  [address]  at [facility] [address], [address], [place], [address] [facility]  [phone]   [phone]      OUTPATIENT PHYSICAL THERAPY: Daily Treatment Note 2019  Pre-treatment Symptoms/Complaints:  Ms. Magy Diallo reported that she was awoken in the middle of the night with calf pain/cramp that continues to persist.  I did observe her limping from her parking space in the parking lot on her way into the clinic today. Pain: Initial:   8/10 lumbar pain. Mary Bulls [facility] /10 generally Post Session:  8/10 lumbar   Medications Last Reviewed:    Updated Objective Findings:  See evaluation note from today. Today was a reassessment visit as well. TREATMENT:   THERAPEUTIC EXERCISE: (23 minutes):  Exercises per grid below to improve mobility, strength and activity tolerance. Required no   cues to promote proper body alignment. Progressed duration as indicated. MANUAL THERAPY: (15 minutes): Soft tissue mobilization was utilized and necessary because of the patient's restricted joint motion and painful spasm. MODALITIES: (0 minutes): *  Electrical Stimulation Therapy (TENS) was provided with intensity adjusted throughout treatment to patient tolerance. Date:  19 Date:  19 Date:  19 Date:  19   Activity/Exercise Parameters      Sustained Ambulation    6 minutes with constant pace   NuStep/SciFit 1 min L1, 12 min L1.5 14 min L1.5 NuStep L2/5 min  L3/5 min NuStep L3 10 minutes   Prone Lumbar Stretching 3 stages x 2 held for 30 sec      LTR  x15 w/3 sec hold     Hamstring Stretching 3 x 30 sec while sitting edge of mat      Bridging  W/knee wedge and angel luis roll     Manual Therapy    In Prone:  Right calf massage/soft tissue mobility x 15 minutes. No swelling, redness, or heat present.   Negative squeeze test.  I worked on increasing the extensibility of her gastroc/soleus complex while also encouraging fluid exchange of her spasming musculature. Moist Heat         Cold Therapy 10 minutes in prone at end of session 10 minutes in prone at end of session     Penny Disc/Clock Exercises   Anterior/Posterior 45 sec x 2 reps. Clock wise rotations x 2 min. CCW x 2 min. Theraball Exercises:     * Contralateral Marching     * Theraband shoulder IR/ER   ------ w/ largest ball -----  * 15 reps (w/increased Lumbar pain)  * 3 sets/10 reps of B IR and 3 sets of B ER    Cervical Traction  1x45 seconds     TENS  To L5 targeted area x 45 minutes To L5 targeted area x 45 minutes  Patient controlled intensity. Patient/Family Education:               [x] Fibromyalgia Program Plan of Care and Contract (with pt signature)              [x] Benefits of Exercise              [x] Pain Management              [] Nutrition              [x] Improving sleep              [] Body Mechanics              [x] Fatigue              [x] Grief/Depression/Stress    [employer] Portal  Treatment/Session Summary:    · Response to Treatment:  Ms. Declan Max successfully participated in todays exercises and accurately reported her body's responce during this session. Ms. Declan Max reported feeling \"looser\" after today's session (especially her right calf) with no increase in pain. .  · Communication/Consultation:  None today  · Equipment provided today:  None today  · Recommendations/Intent for next treatment session: Next visit will focus on continuing to find an appropriate level of activity to perform during our sessions in order to challenge but not overwhelm Ms. Herrera.   Treatment Plan of Care Effective Dates:  8/22/19 to 11/20/19  Total Treatment Billable Duration:  45 minutes  PT Patient Time In/Time Out  Time In: 0845  Time Out: [address] [place] [address], PT    Future Appointments   Date Time Provider Bijan Dc   9/30/2019 10:15 AM Hieu Carr, PT [facility]   10/3/2019 10:15 AM Michaelle Singh PTA [facility] 10/7/2019 10:15 AM Hieu Carr, PT [facility]   10/10/2019 10:15 AM Michaelle Singh PTA [facility]   10/30/2019 10:00 AM Valentino Coello MD S[facility] CMW CMW

## 2019-09-27 NOTE — THERAPY EVALUATION
Richard Chencho : 1995 Primary: [address] Other Secondary:  [facility], [address] [facility], [address] [phone]   [phone] OUTPATIENT PHYSICAL THERAPY:Reassessment 2019 ICD-10: Treatment Diagnosis:  
Pain in right arm (M79.601) Pain in left arm (M79.602) Pain in right leg (M79.604) Pain in left leg (M79.605) Low back pain (M54.5) Precautions/Allergies: Patient has no known allergies. MD Orders: PT Eval MEDICAL/REFERRING DIAGNOSIS: 
Generalized pain [R52] DATE OF ONSET: Chronic REFERRING PHYSICIAN: Paris Frost MD  
Reassessment Visit (penitentiary thru program) 19:  Ms. Valentino Bryant presents with reports of continuing general pain (LBP is her highest and most consistent complaint) but she also reports an increased ability to participate in the activities of her life. A recent example of her improved tolerance is that she was able to travel to [facility] and tolerated a higher level of participation while attending her conference and sightseeing without a related increase in pain. Ms. Valentino Bryant will continue to benefit from participating in our sessions. INITIAL ASSESSMENT:  Ms. Valentino Bryant presents today with generalized pain that has been getting worse. In relation to her mobility and tolerance to perform activities of daily living she would benefit from a bout of physical therapy to increase her tolerance to sustained low level activities such as walking, lifting, bending, and reaching. Our plan is to incorporate education, practice, and activities to challenge and grow her tolerance to perform these activities. PROBLEM LIST (Impacting functional limitations): 1. Decreased Strength 2. Decreased ADL/Functional Activities 3. Increased Pain 4. Decreased Activity Tolerance 5. Decreased Pacing Skills 6. Increased Fatigue 7. Decreased Flexibility/Joint Mobility 8. Decreased Knowledge of Precautions 9. Decreased Anderson with Home Exercise Program INTERVENTIONS PLANNED: (Treatment may consist of any combination of the following) 1. Aquatic Therapy 2. Balance Exercise 3. Electrical Stimulation (E-Stim) 4. Gait Training 5. Heat 6. Home Exercise Program (HEP) 7. Manual Therapy 8. Therapeutic Activities 9. Therapeutic Exercise 10. Transcutaneous Electrical Nerve Stimulation (TENS) TREATMENT PLAN: 
Effective Dates: 8/22/2019 TO 11/20/2019 (90 days). Frequency/Duration: 2 times a week for up to 16 treatment sessions. GOALS: (Goals have been discussed and agreed upon with patient.) Discharge Goals: Time Frame: 8 Weeks STATUS: (.goalstatus) 1. Ms. Avinash Nelson will be independent with her HEP. Ongoing 2. Ms. Avinash Nelson will score 60 or below on the Fibromyalgia Impact Questionnaire. Revised Goal:  Ms. Avinash Nelson will score 50 or below on the FIQ. MET: (9/27/2019) Revised Goal: Ongoing 3. Ms. Avinash Nelson will be able to walk greater than 1600 feet in 6 minutes while reporting a pain score of 6/10 or lower. Ongoing Met Distance portion but pain is still too high. OUTCOME MEASURE:  
Tool Used: 6-MINUTE WALK TEST Score:  Initial: 1650 feet (10/10 pain after /110) Most Recent: 1800 feet (Date: 9-27-19 ) Interpretation of Score: Normal range varies but is approximately 6025-0624 Feet Tool Used: Fibromyalgia Impact Questionnaire Initial Score:  73.59 Severe Impact Date:  8/23/2019 Interpretation of Score: <40=Mild, 40-60=Moderate, >60-70=Severe Fibromyalgia Syndrome Subsequent Score:  57.62 Moderate Impact Date:  9/27/2019 MEDICAL NECESSITY:  
· Patient demonstrates good rehab potential due to higher previous functional level. REASON FOR SERVICES/OTHER COMMENTS: 
· Patient continues to require modification of therapeutic interventions to increase complexity of exercises Total Duration: 
  
 
Rehabilitation Potential For Stated Goals: Good Regarding Beth Herrera's therapy, I certify that the treatment plan above will be carried out by a therapist or under their direction. Thank you for this referral, Giovana Luna PT Referring Physician Signature: Hugh Campos MD _______________________________ Date _____________

## 2019-09-30 ENCOUNTER — HOSPITAL ENCOUNTER (OUTPATIENT)
Dept: PHYSICAL THERAPY | Age: 24
Discharge: HOME | End: 2019-09-30
Payer: COMMERCIAL

## 2019-09-30 PROCEDURE — 97530 THERAPEUTIC ACTIVITIES: CPT

## 2019-09-30 PROCEDURE — 97110 THERAPEUTIC EXERCISES: CPT

## 2019-09-30 PROCEDURE — 97014 ELECTRIC STIMULATION THERAPY: CPT

## 2019-09-30 NOTE — PROGRESS NOTES
Ron Alonzo  : 1995  Primary: Jazzmine Lozoya [facility] Other  Secondary:  [address]  at [facility], [address], [place], [address] [facility]  [phone]   [phone]      OUTPATIENT PHYSICAL THERAPY: Daily Treatment Note 2019  Pre-treatment Symptoms/Complaints:  Ms. Rajat Peoples reported that she had a very active and somewhat stressful weekend. She participated in a fundraising walk for down's syndrome and did a lot of driving. Pain: Initial:   8.5/10 lumbar pain. Dominguez Dheeraj Dominguez Dheeraj 810 generally Post Session:  8/10 lumbar   Medications Last Reviewed:    Updated Objective Findings:  None Today. TREATMENT:   THERAPEUTIC ACTIVITY: ( 8 minutes): Therapeutic activities per grid below to improve mobility and proper musculature extensibility/relaxation. Required minimal verbal cues to promote coordination of muscular control of various muscle groups (including cervical, shoulders, abdominals, legs, etc.). THERAPEUTIC EXERCISE: (45 minutes):  Exercises per grid below to improve mobility, strength and activity tolerance. Required no   cues to promote proper body alignment. Progressed duration as indicated. MANUAL THERAPY: (0 minutes): Soft tissue mobilization was utilized and necessary because of the patient's restricted joint motion and painful spasm. MODALITIES: (40 minutes): *  Electrical Stimulation Therapy (TENS) was provided with intensity adjusted throughout treatment to patient tolerance. Date:  19 Date:  19 Date:  19 Date:  19   Activity/Exercise       Sustained Ambulation   6 minutes with constant pace    NuStep/SciFit 14 min L1.5 NuStep L2/5 min  L3/5 min NuStep L3 10 minutes NuStep L1-3 5 minutes   L4 10 minutes   Prone Lumbar Stretching       LTR x15 w/3 sec hold      Hamstring Stretching       Bridging W/knee wedge and angel luis roll      Manual Therapy   In Prone:  Right calf massage/soft tissue mobility x 15 minutes.   No swelling, redness, or heat present. Negative squeeze test.  I worked on increasing the extensibility of her gastroc/soleus complex while also encouraging fluid exchange of her spasming musculature. Taught/practiced relaxation technique and cervical/upper trapezius stretching in chair     X 8 min   Cold Therapy 10 minutes in prone at end of session      Penny Disc/Clock Exercises  Anterior/Posterior 45 sec x 2 reps. Clock wise rotations x 2 min. CCW x 2 min. *   Theraball Exercises:     * Contralateral Marching     * Theraband shoulder IR/ER     * Theraband Rows  ------ w/ largest ball -----  * 15 reps (w/increased Lumbar pain)  * 3 sets/10 reps of B IR and 3 sets of B ER  ------ w/ largest ball -----  *   *   * 15 x 2 sets (green) w/feet on foam   Cervical Traction 1x45 seconds      TENS To L5 targeted area x 45 minutes To L5 targeted area x 45 minutes  Patient controlled intensity. To L5 targeted area x 45 minutes  Patient controlled intensity. Quadriped on Mat Table  Reciprocal       Calf Stretching    On incline board 3x30 sec  On 6\" step x 15 reps     Patient/Family Education:               [x] Fibromyalgia Program Plan of Care and Contract (with pt signature)              [x] Benefits of Exercise              [x] Pain Management              [] Nutrition              [x] Improving sleep              [x] Body Mechanics              [x] Fatigue              [x] Grief/Depression/Stress    [facility]  Treatment/Session Summary:    · Response to Treatment:  Ms. Mally Johnson successfully participated in todays exercises and accurately reported her body's responce during this session. .  · Communication/Consultation:  None today  · Equipment provided today:  None today  · Recommendations/Intent for next treatment session: Next visit will focus on continuing to find an appropriate level of activity to perform during our sessions in order to challenge but not overwhelm Ms. Herrera.   Treatment Plan of Care Effective Dates:  8/22/19 to 11/20/19  Total Treatment Billable Duration:  55 minutes  PT Patient Time In/Time Out  Time In: 7959  Time Out: [address], PT    Future Appointments   Date Time Provider Bijan Dc   10/3/2019 10:15 AM [facility]   10/7/2019 10:15 AM BETTIE Obando D   10/10/2019 10:15 AM Brannon Rothman PTA SFLUCIA D   10/30/2019 10:00 AM Emeka Krishnan MD SSA CMW CMW

## 2019-10-03 ENCOUNTER — HOSPITAL ENCOUNTER (OUTPATIENT)
Dept: PHYSICAL THERAPY | Age: 24
Discharge: HOME | End: 2019-10-03
Payer: COMMERCIAL

## 2019-10-03 PROCEDURE — 97113 AQUATIC THERAPY/EXERCISES: CPT

## 2019-10-03 NOTE — PROGRESS NOTES
Rashida [facility]  : 1995  Primary: Ingrid Medley [facility] Other  Secondary:  [address]  at [address] [facility] [address], [address], [place], [facility] [address] [facility]  [phone]   [phone]      OUTPATIENT PHYSICAL THERAPY: Daily Treatment and Aquatic Note 10/3/2019  Pre-treatment Symptoms/Complaints:  Ms. Juanita Garcia reports that she has a slight migraine today. She reports she is doing her stretches. Pain: Initial:  5/10. Post Session:  4-5/10   Medications Last Reviewed:    Updated Objective Findings:  None Today   TREATMENT:   Aquatic Therapy (50 minutes): Aquatic treatment performed per flow grid for Decreased muscle strength, Decreased endurance and Decreased activity endurance. Cues provided for posture and to slow down and breathe.       Aquatic Exercise Log       Date  19 Date  19 Date  19 Date  19 Date:  10-3-19   Activity/ Exercise Parameters Parameters Parameters Parameters    Walking forward 6 laps  4 6 6 6   Walking backward 6 laps  4 6 6 6   Walking sideways 6 laps  4 6 6 6   Marching 4 laps 4 6 6 6   Goose Step 4 laps  4 6 6 6   Tip toes 3 laps   2 3 4   Heels 3 laps   2 3 4   Lunges        Side step squats        LE Exercises No noodle  No noodle  No noodle  Small noodle  Small noodle    Hip Flex/Ext X 10 B X 10 B X 12 B Marching X 10 B Marching x 12 B   Hip Abd/Add X 10 B X 10 B X 12 B X 10 B X 12 B   Hip IR/ER        Calf raises X 10 B X 10 B X 12 B     Knee Flex   X 12 B X 15 B X 12 B   Squats   X 15      Leg Circles 10/10 B 10/10 B 10/10 B 10/10 B  10/10 B   Step Ups X 10 B Small x 10 B Small x 10 B Big step   X 10 b Big step   X 10 B   UE Exercises Small noodle    Small noodle  Small noodle    Squeeze In X 10 B   X 10 B X 12 B   Push Down X 10 B   X 10 B X 12 B   Pull Down X 10 B   X 10 B X 12 B   Bicep/Tricep    X 10 B    Rows/Press outs         Chi Positions        Trunk Rotation        Deep H2O/ Noodles rings Seated at bench Seated at bench Seated at bench     Stabilization        Arms only    Seated on noodle - legs only 4 laps arms only 4 laps  Seated on noodle - 2 laps    Legs only Bicycle - 2 min  Bicycle - 2 min  Bicycle - 2 min  Bicycle - 2 min  Seated on noodle - 4 laps    [place] 2 min  1 min  1 min  1 min  X 25    Scissors 2 min  1 min  1 min  1 min     Crab walk        Lower abdominal   work  DKTC - x 25  Clams - x 25  Reverse clams x 25   Clams x 25   Reverse clams x 25  Clams x 20   Reverse clams x 20    Cardio    Squats bringing legs up to chest on noodle   X 5  Squats on noodle - bringing knees to chest   X 15    Jogging        Lap Swimming        Stretches        Hamstrings        Heelcords        Piriformis        Neck                    [employer] Portal  Treatment/Session Summary:  Patient reports she is feeling good upon departure. No signs of an episode today. · Response to Treatment:  Ms. Clarisa Haji is improving with endurance and mobility with water environment. · Communication/Consultation:  None today  · Equipment provided today:  None today  · Recommendations/Intent for next treatment session: Next visit will focus on continuing to find an appropriate level of activity to perform during our sessions in order to challenge but not overwhelm Ms. Herrera.   Treatment Plan of Care Effective Dates:  8/22/19 to 11/20/19  Total Treatment Billable Duration:  50 minutes  PT Patient Time In/Time Out  Time In: 1010  Time Out: SHARA Durán    Future Appointments   Date Time Provider Bijan Dc   10/7/2019 10:15 AM Willow Maria PT [facility] SFD   10/10/2019 10:15 AM Alba Mead PTA [facility] SFD   10/30/2019 10:00 AM Kevin Ruiz MD SSA CMW CMW

## 2019-10-07 ENCOUNTER — HOSPITAL ENCOUNTER (OUTPATIENT)
Dept: PHYSICAL THERAPY | Age: 24
End: 2019-10-07
Payer: COMMERCIAL

## 2019-10-10 ENCOUNTER — HOSPITAL ENCOUNTER (OUTPATIENT)
Dept: PHYSICAL THERAPY | Age: 24
Discharge: HOME | End: 2019-10-10
Payer: COMMERCIAL

## 2019-10-11 ENCOUNTER — HOSPITAL ENCOUNTER (OUTPATIENT)
Dept: PHYSICAL THERAPY | Age: 24
Discharge: HOME | End: 2019-10-11
Payer: COMMERCIAL

## 2019-10-11 PROCEDURE — 97014 ELECTRIC STIMULATION THERAPY: CPT

## 2019-10-11 PROCEDURE — 97110 THERAPEUTIC EXERCISES: CPT

## 2019-10-11 NOTE — PROGRESS NOTES
Grazyna Mike  : 1995  Primary: Moose Lopez [facility] Other  Secondary:  [address]  [facility], [address], [place], [address] [facility]  [phone]   [phone]      OUTPATIENT PHYSICAL THERAPY: Daily Treatment Note 10/11/2019  Pre-treatment Symptoms/Complaints:  Ms. Jeet Garcia reported that she had a very active and somewhat stressful weekend. She participated in a fundraising walk for down's syndrome and did a lot of driving. Pain: Initial:   8.5/10 lumbar pain Post Session:  8/10 lumbar   Medications Last Reviewed:    Updated Objective Findings:  None Today. TREATMENT:   THERAPEUTIC ACTIVITY: ( 8 minutes): Therapeutic activities per grid below to improve mobility and proper musculature extensibility/relaxation. Required minimal verbal cues to promote coordination of muscular control of various muscle groups (including cervical, shoulders, abdominals, legs, etc.). THERAPEUTIC EXERCISE: (45 minutes):  Exercises per grid below to improve mobility, strength and activity tolerance. Required no   cues to promote proper body alignment. Progressed duration as indicated. MANUAL THERAPY: (0 minutes): Soft tissue mobilization was utilized and necessary because of the patient's restricted joint motion and painful spasm. MODALITIES: (40 minutes): *  Electrical Stimulation Therapy (TENS) was provided with intensity adjusted throughout treatment to patient tolerance. Date:  19 Date:  19 Date:  19 Date:  10/11/19   Activity/Exercise       Sustained Ambulation  6 minutes with constant pace     NuStep/SciFit NuStep L2/5 min  L3/5 min NuStep L3 10 minutes NuStep L1-3 5 minutes   L4 10 minutes NuStep L1-3 5 minutes   L4 10 minutes  L5 7 minutes   Prone Lumbar Stretching       LTR       Hamstring Stretching       Bridging       Manual Therapy  In Prone:  Right calf massage/soft tissue mobility x 15 minutes.   No swelling, redness, or heat present. Negative squeeze test.  I worked on increasing the extensibility of her gastroc/soleus complex while also encouraging fluid exchange of her spasming musculature. Taught/practiced relaxation technique and cervical/upper trapezius stretching in chair    X 8 min X 8 min   Cold Therapy    Cold pack to cervical region after session x 10 min   Penny Disc/Clock Exercises Anterior/Posterior 45 sec x 2 reps. Clock wise rotations x 2 min. CCW x 2 min. *    Theraball Exercises:     * Contralateral Marching     * Theraband shoulder IR/ER     * Theraband Rows ------ w/ largest ball -----  * 15 reps (w/increased Lumbar pain)  * 3 sets/10 reps of B IR and 3 sets of B ER  ------ w/ largest ball -----  *   *   * 15 x 2 sets (green) w/feet on foam    Cervical Traction       TENS To L5 targeted area x 45 minutes  Patient controlled intensity. To L5 targeted area x 45 minutes  Patient controlled intensity. To T12/L1 targeted area x 45 minutes  Patient controlled intensity. Quadriped on [employer] Table  Reciprocal    Cat/Dog Pose x 10 reps  Single leg extension x 10 B  Single arm shldr flexion x 10 B  Contralateral leg/arm x 12 B   Calf Stretching   On incline board 3x30 sec  On 6\" step x 15 reps      Patient/Family Education:               [x] Fibromyalgia Program Plan of Care and Contract (with pt signature)              [x] Benefits of Exercise              [x] Pain Management              [] Nutrition              [x] Improving sleep              [x] Body Mechanics              [x] Fatigue              [x] Grief/Depression/Stress    [employer] Portal  Treatment/Session Summary:    · Response to Treatment:  Ms. Michael Avila successfully participated in Twineds exercises and accurately reported her body's responce during this session. .  · Communication/Consultation:  None today  · Equipment provided today:  None today  · Recommendations/Intent for next treatment session: Next visit will focus on continuing to find an appropriate level of activity to perform during our sessions in order to challenge but not overwhelm Ms. Herrera.   Treatment Plan of Care Effective Dates:  8/22/19 to 11/20/19  Total Treatment Billable Duration:  55 minutes  PT Patient Time In/Time Out  Time In: 0805  Time Out: 0900  Alie Nguyen, BETTIE    Future Appointments   Date Time Provider Bijan Dc   10/14/2019 10:15 AM Valentino Gore, BETTIE [facility]   10/17/2019 11:00 AM SHARA Hidalgo [facility]   10/21/2019 11:00 AM Valentino Gore, PT S[facility]PJOR[facility]   10/24/2019 11:00 AM [facility] [place] [facility]   10/30/2019 10:00 AM Ephraim Hodgkins, MD SSA CMW CMW   11/7/2019  7:30 AM Ephraim Hodgkins, MD SSA CMW CMW

## 2019-10-14 ENCOUNTER — HOSPITAL ENCOUNTER (OUTPATIENT)
Dept: PHYSICAL THERAPY | Age: 24
Discharge: HOME | End: 2019-10-14
Payer: COMMERCIAL

## 2019-10-14 PROCEDURE — 97110 THERAPEUTIC EXERCISES: CPT

## 2019-10-14 PROCEDURE — 97140 MANUAL THERAPY 1/> REGIONS: CPT

## 2019-10-17 ENCOUNTER — HOSPITAL ENCOUNTER (OUTPATIENT)
Dept: PHYSICAL THERAPY | Age: 24
Discharge: HOME | End: 2019-10-17
Payer: COMMERCIAL

## 2019-10-17 PROCEDURE — 97113 AQUATIC THERAPY/EXERCISES: CPT

## 2019-10-17 NOTE — PROGRESS NOTES
Joseph Stewart  : 1995  Primary: Anabib Belia [facility] Other  Secondary:  [address]  at [facility] 68, [address], [place], [facility] [address] [facility]  [phone]   [phone]      OUTPATIENT PHYSICAL THERAPY: Daily Treatment and Aquatic Note 10/17/2019  Pre-treatment Symptoms/Complaints:  Ms. Declan Max reports that she is having a rough week all the way around. Pain averaged to be 8/10. Pain: Initial:  8/10. Post Session:  -6/10   Medications Last Reviewed:    Updated Objective Findings:  None Today   TREATMENT:   Aquatic Therapy (50 minutes): Aquatic treatment performed per flow grid for Decreased muscle strength, Decreased endurance and Decreased activity endurance. Cues provided for posture and to slow down and breathe.       Aquatic Exercise Log       Date  19 Date  19 Date  19 Date  19 Date:  10-3-19 Date:  10-17-19   Activity/ Exercise Parameters Parameters Parameters Parameters     Walking forward 6 laps  4 6 6 6 6   Walking backward 6 laps  4 6 6 6 6   Walking sideways 6 laps  4 6 6 6 6   Marching 4 laps 4 6 6 6 6   Goose Step 4 laps  4 6 6 6 6   Tip toes 3 laps   2 3 4    Heels 3 laps   2 3 4    Lunges         Side step squats         LE Exercises No noodle  No noodle  No noodle  Small noodle  Small noodle  No weights- per patient request    Hip Flex/Ext X 10 B X 10 B X 12 B Marching X 10 B Marching x 12 B X 10 B   Hip Abd/Add X 10 B X 10 B X 12 B X 10 B X 12 B X 10 B   Hip IR/ER         Calf raises X 10 B X 10 B X 12 B      Knee Flex   X 12 B X 15 B X 12 B X 10 B   Squats   X 15    On noodle - 10    Leg Circles 10/10 B 10/10 B 10/10 B 10/10 B  10/10 B 10/10 B   Step Ups X 10 B Small x 10 B Small x 10 B Big step   X 10 b Big step   X 10 B Big step   X 10 B   UE Exercises Small noodle    Small noodle  Small noodle  Purple and white noodle    Squeeze In X 10 B   X 10 B X 12 B X 10 B   Push Down X 10 B   X 10 B X 12 B X 10 B   Pull Down X 10 B X 10 B X 12 B X 10 B   Bicep/Tricep    X 10 B     Rows/Press outs      X 10 B    Chi Positions         Trunk Rotation      X 10 B   Deep H2O/ Noodles rings Seated at bench Seated at bench  Seated at bench      Stabilization         Arms only    Seated on noodle - legs only 4 laps arms only 4 laps  Seated on noodle - 2 laps     Legs only Bicycle - 2 min  Bicycle - 2 min  Bicycle - 2 min  Bicycle - 2 min  Seated on noodle - 4 laps     [place] 2 min  1 min  1 min  1 min  X 25     Scissors 2 min  1 min  1 min  1 min      Crab walk         Lower abdominal   work  DKTC - x 25  Clams - x 25  Reverse clams x 25   Clams x 25   Reverse clams x 25  Clams x 20   Reverse clams x 20     Cardio    Squats bringing legs up to chest on noodle   X 5  Squats on noodle - bringing knees to chest   X 15     Jogging         Lap Swimming         Stretches         Hamstrings         Heelcords         Piriformis         Neck                      [employer] Portal  Treatment/Session Summary:  Patient reports she is slightly better upon departure. No signs of an episode today. · Response to Treatment:  Ms. Eve Stone is improving with endurance and mobility with water environment. · Communication/Consultation:  None today  · Equipment provided today:  None today  · Recommendations/Intent for next treatment session: Next visit will focus on continuing to find an appropriate level of activity to perform during our sessions in order to challenge but not overwhelm Ms. Herrera.   Treatment Plan of Care Effective Dates:  8/22/19 to 11/20/19  Total Treatment Billable Duration:  50 minutes  PT Patient Time In/Time Out  Time In: 1100  Time Out: 146 Alda Roberts PTA    Future Appointments   Date Time Provider Bijan Dc   10/21/2019 11:00 AM Carrol Virgen PT [facility]   10/24/2019 11:00 AM Shalom Villalobos PTA [facility] SFD   10/30/2019 10:00 AM aMlly Ferris MD SSA CMW CMW   11/7/2019  7:30 AM Mally Ferris MD SSA CMW CMW

## 2019-10-24 ENCOUNTER — HOSPITAL ENCOUNTER (OUTPATIENT)
Dept: PHYSICAL THERAPY | Age: 24
Discharge: HOME | End: 2019-10-24
Payer: COMMERCIAL

## 2019-10-24 PROCEDURE — 97113 AQUATIC THERAPY/EXERCISES: CPT

## 2019-10-24 NOTE — PROGRESS NOTES
Joseph Stewart  : 1995  Primary: Anabib Belia [facility] Other  Secondary:  [address]  at [facility], [address], [place], [place] [facility]  [phone]   [phone]      OUTPATIENT PHYSICAL THERAPY: Daily Treatment and Aquatic Note 10/24/2019  Pre-treatment Symptoms/Complaints:  Ms. Declan Max reports that she was in the ER over the weekend and was diagnosed with costochondritis. She states the doctor's also mentioned that she has a slight scoliosis in her upper back. She reports being in a lot of pain today. Pain: Initial:  8/10. Post Session:  5-6/10   Medications Last Reviewed:    Updated Objective Findings:  None Today   TREATMENT:   Aquatic Therapy (50 minutes): Aquatic treatment performed per flow grid for Decreased muscle strength, Decreased endurance and Decreased activity endurance. Cues provided for posture and to slow down and breathe.       Aquatic Exercise Log       Date  19 Date  19 Date:  10-3-19 Date:  10-17-19 Date:  10-24-19   Activity/ Exercise Parameters Parameters      Walking forward 6 6 6 6 6   Walking backward 6 6 6 6 6   Walking sideways 6 6 6 6 6   Marching 6 6 6 6 4   Goose Step 6 6 6 6 4   Tip toes 2 3 4  4   Heels 2 3 4  4   Lunges        Side step squats        LE Exercises No noodle  Small noodle  Small noodle  No weights- per patient request  1# weights   Hip Flex/Ext X 12 B Marching X 10 B Marching x 12 B X 10 B X 10 B   Hip Abd/Add X 12 B X 10 B X 12 B X 10 B X 10 B   Hip IR/ER        Calf raises X 12 B       Knee Flex X 12 B X 15 B X 12 B X 10 B X 10 B   Squats X 15    On noodle - 10  On noodle    Leg Circles 10/10 B 10/10 B  10/10 B 10/10 B 10/10 B   Step Ups Small x 10 B Big step   X 10 b Big step   X 10 B Big step   X 10 B Bog step x 10 B   UE Exercises  Small noodle  Small noodle  Purple and white noodle  Purple and white    Squeeze In  X 10 B X 12 B X 10 B X 10 B   Push Down  X 10 B X 12 B X 10 B X 10 B   Pull Down  X 10 B X 12 B X 10 B    Bicep/Tricep  X 10 B      Rows/Press outs    X 10 B     Chi Positions        Trunk Rotation    X 10 B    Deep H2O/ Noodles Seated at bench  Seated at bench       Stabilization        Arms only  Seated on noodle - legs only 4 laps arms only 4 laps  Seated on noodle - 2 laps      Legs only Bicycle - 2 min  Bicycle - 2 min  Seated on noodle - 4 laps      [place] 1 min  1 min  X 25      Scissors 1 min  1 min       Crab walk        Lower abdominal   work   Clams x 25   Reverse clams x 25  Clams x 20   Reverse clams x 20   Clams x 20 B  Reverse clams x 20 B   Cardio  Squats bringing legs up to chest on noodle   X 5  Squats on noodle - bringing knees to chest   X 15   Squats on noodle - bringing knees to chest with weights on ankles. Jogging        Lap Swimming        Stretches        Hamstrings        Heelcords        Piriformis        Neck                    [employer] Portal  Treatment/Session Summary:  Patient reports she is slightly better upon departure. No signs of an episode today. · Response to Treatment:  Ms. Gemma Luo is improving with endurance and mobility with water environment. · Communication/Consultation:  None today  · Equipment provided today:  None today  · Recommendations/Intent for next treatment session: Next visit will focus on continuing to find an appropriate level of activity to perform during our sessions in order to challenge but not overwhelm Ms. Herrera.   Treatment Plan of Care Effective Dates:  8/22/19 to 11/20/19  Total Treatment Billable Duration:  50 minutes  PT Patient Time In/Time Out  Time In: 1100  Time Out: 146 Alda Roberts, PTA    Future Appointments   Date Time Provider Bijan Dc   10/28/2019 11:00 AM Jaxson Mckeon, BETTIE [facility]   10/30/2019 10:00 AM MD SONU Buck CMW CMW   11/7/2019  7:30 AM MD SONU Buck CMW CMW

## 2019-10-28 ENCOUNTER — HOSPITAL ENCOUNTER (OUTPATIENT)
Dept: PHYSICAL THERAPY | Age: 24
Discharge: HOME | End: 2019-10-28
Payer: COMMERCIAL

## 2019-10-28 PROCEDURE — 97164 PT RE-EVAL EST PLAN CARE: CPT

## 2019-10-28 PROCEDURE — 97110 THERAPEUTIC EXERCISES: CPT

## 2019-10-28 NOTE — THERAPY DISCHARGE
Rosemary Mead : 1995 Primary: 34 Alda Farrell Other Secondary:  Therapy Center at [facility], [address], [address] [phone]   [phone] OUTPATIENT PHYSICAL THERAPY:Reassessment 10/28/2019 ICD-10: Treatment Diagnosis:  
Pain in right arm (M79.601) Pain in left arm (M79.602) Pain in right leg (M79.604) Pain in left leg (M79.605) Low back pain (M54.5) Precautions/Allergies: Patient has no known allergies. MD Orders: PT Eval MEDICAL/REFERRING DIAGNOSIS: 
Generalized pain [R52] DATE OF ONSET: Chronic REFERRING PHYSICIAN: Gaviota Frost MD  
Reassessment Visit (Final visit) 10/28/19:  Ms. Pee Ruiz presents with continued reports of general pain (LBP is her highest and most consistent complaint)  During this visit Ms. Pee Ruiz expressed that she feels that one of the main benefits from participating in our program is that she now knows how to manage some of her pain and how to appropriately regulate her activity activity levels in order to exert some level of control over her body's response to activity. Over the course of her treatment  Ms. Pee Ruiz demonstrated a decreased score on the Fibromyalgia Impact Questionnaire and has gained the ability to walk a significantly further distance on the Six Minute Walk Test.  Most of Ms. Herrera's continuing limitations seem to be related to her lumbar spinal (structural) issues. Thank you for allowing us to work with your patient. INITIAL ASSESSMENT:  Ms. Pee Ruiz presents today with generalized pain that has been getting worse. In relation to her mobility and tolerance to perform activities of daily living she would benefit from a bout of physical therapy to increase her tolerance to sustained low level activities such as walking, lifting, bending, and reaching.   Our plan is to incorporate education, practice, and activities to challenge and grow her tolerance to perform these activities. PROBLEM LIST (Impacting functional limitations): 1. Decreased Strength 2. Decreased ADL/Functional Activities 3. Increased Pain 4. Decreased Activity Tolerance 5. Decreased Pacing Skills 6. Increased Fatigue 7. Decreased Flexibility/Joint Mobility 8. Decreased Knowledge of Precautions 9. Decreased [place] with Home Exercise Program INTERVENTIONS PLANNED: (Treatment may consist of any combination of the following) 1. Aquatic Therapy 2. Balance Exercise 3. Electrical Stimulation (E-Stim) 4. Gait Training 5. Heat 6. Home Exercise Program (HEP) 7. Manual Therapy 8. Therapeutic Activities 9. Therapeutic Exercise 10. Transcutaneous Electrical Nerve Stimulation (TENS) TREATMENT PLAN: 
Effective Dates: 8/22/2019 TO 11/20/2019 (90 days). Frequency/Duration: 2 times a week for up to 16 treatment sessions. GOALS: (Goals have been discussed and agreed upon with patient.) Discharge Goals: Time Frame: 8 Weeks STATUS: (.goalstatus) 1. Ms. Sapna Joseph will be independent with her HEP. MET: (10/28/2019) 2. Ms. Sapna Joseph will score 60 or below on the Fibromyalgia Impact Questionnaire. Revised Goal:  Ms. Sapna Joseph will score 50 or below on the FIQ. MET: (9/27/2019) Revised Goal: Un Met 3. Ms. Sapna Joseph will be able to walk greater than 1600 feet in 6 minutes while reporting a pain score of 6/10 or lower. Partially met Met Distance portion but pain is still too high. OUTCOME MEASURE:  
Tool Used: 6-MINUTE WALK TEST Score:  Initial: 1650 feet (10/10 pain after /110) Most Recent: 1800 feet (Date: 10/28/19 ) Interpretation of Score: Normal range varies but is approximately 1754-2202 Feet Tool Used: Fibromyalgia Impact Questionnaire Initial Score:  73.59 Severe Impact Date:  8/23/2019 Interpretation of Score: <40=Mild, 40-60=Moderate, >60-70=Severe Fibromyalgia Syndrome Subsequent Score:  57.62 Moderate Impact Date:  9/27/2019 Discharge Score:  62.62  Date:  10/28/2019 Total Duration: PT Patient Time In/Time Out Time In: 1100 Time Out: 1205 Rehabilitation Potential For Stated Goals: Good Regarding Quentin Herrera's therapy, I certify that the treatment plan above will be carried out by a therapist or under their direction. Thank you for this referral, Juancho Acosta, PT No signature is necessary for this note. This note is informational only. Thank you.

## 2019-10-28 NOTE — PROGRESS NOTES
Rashida [facility]  : 1995  Primary: [facility] Other  Secondary:  [address]  at [facility], [address], [place], [facility]  [phone]   [phone]      OUTPATIENT PHYSICAL THERAPY: Daily Treatment Note 10/28/2019  Pre-treatment Symptoms/Complaints:  Ms. Juanita Garcia reported that she had a very active and somewhat stressful weekend. I lifted  some heavy items this weekend for work and my back is hurting a lot this morning (Lumbar and Thoracic)  Pain: Initial:   9/10 lumbar pain Post Session:  7/10 lumbar   Medications Last Reviewed:    Updated Objective Findings:  See evaluation note from today. TREATMENT:   THERAPEUTIC EXERCISE: (23 minutes):  Exercises per grid below to improve mobility, strength and activity tolerance. Required no   cues to promote proper body alignment. Progressed duration as indicated. MODALITIES: (15 minutes):      *  Cold Pack Therapy in order to provide analgesia and reduce inflammation and edema. Applied to Ms. Herrera's low back/thoracic area in prone. Date:  19 Date:  19 Date:  10/11/19 Date:  10/14/19 Date:  10/28/19   Activity/Exercise        Sustained Ambulation 6 minutes with constant pace   8 minutes with constant pace 8 minutes with constant pace   NuStep/SciFit NuStep L3 10 minutes NuStep L1-3 5 minutes   L4 10 minutes NuStep L1-3 5 minutes   L4 10 minutes  L5 7 minutes NuStep L4 10 minutes  L5 2  Minutes  L6 3 Minutes NuStep L4 15 minutes   Prone Lumbar Stretching        LTR        Manual Therapy In Prone:  Right calf massage/soft tissue mobility x 15 minutes. No swelling, redness, or heat present. Negative squeeze test.  I worked on increasing the extensibility of her gastroc/soleus complex while also encouraging fluid exchange of her spasming musculature. In Manual Chair: Moist heat during Nu Step, followed by manual therapy to paraspinals and lower traps.   Utilized green thera-roller and finished with 10 minutes of frozen roller to lumbar (highest pain site). Taught/practiced relaxation technique and cervical/upper trapezius stretching in chair   X 8 min X 8 min     Cold Therapy   Cold pack to cervical region after session x 10 min  Cold pack to cervical region after session x 10 min   Penny Disc/Clock Exercises  *      Theraball Exercises:     * Contralateral Marching     * Theraband shoulder IR/ER     * Theraband Rows  ------ w/ largest ball -----  *   *   * 15 x 2 sets (green) w/feet on foam      Cervical Traction        TENS  To L5 targeted area x 45 minutes  Patient controlled intensity. To T12/L1 targeted area x 45 minutes  Patient controlled intensity. Quadriped on [employer] Table  Reciprocal   Cat/Dog Pose x 10 reps  Single leg extension x 10 B  Single arm shldr flexion x 10 B  Contralateral leg/arm x 12 B     Calf Stretching  On incline board 3x30 sec  On 6\" step x 15 reps        Patient/Family Education:               [x] Fibromyalgia Program Plan of Care and Contract (with pt signature)              [x] Benefits of Exercise              [x] Pain Management              [] Nutrition              [x] Improving sleep              [x] Body Mechanics              [x] Fatigue              [x] Grief/Depression/Stress    [employer] Portal  Treatment/Session Summary:    · Response to Treatment:  Ms. Sapna Joseph successfully participated in [employer] exercises and accurately reported her body's responce during this session. .  · Communication/Consultation:  None today  · Equipment provided today:  None today  · Recommendations/Intent for next treatment session: Next visit will focus on continuing to find an appropriate level of activity to perform during our sessions in order to challenge but not overwhelm Ms. Herrera.   Treatment Plan of Care Effective Dates:  8/22/19 to 11/20/19  Total Treatment Billable Duration:  40 minutes  PT Patient Time In/Time Out  Time In: 1100  Time Out: [facility], PT    Future Appointments   Date Time Provider Bijan Dc   10/30/2019 10:00 AM MD SONU Rodríguez CMW CMW   11/7/2019  7:30 AM MD SONU Rodríguez CMW CMW

## 2020-05-26 PROBLEM — E28.2 PCOS (POLYCYSTIC OVARIAN SYNDROME): Status: ACTIVE | Noted: 2020-05-26
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
## 2022-03-18 PROBLEM — R10.2 PELVIC PAIN IN FEMALE: Status: ACTIVE | Noted: 2018-07-19

## 2022-03-18 PROBLEM — N93.9 MENSTRUAL BLEEDING PROBLEM: Status: ACTIVE | Noted: 2018-07-19

## 2022-03-18 PROBLEM — N92.6 MENSTRUAL BLEEDING PROBLEM: Status: ACTIVE | Noted: 2018-07-19
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
## 2022-03-19 PROBLEM — E28.2 PCOS (POLYCYSTIC OVARIAN SYNDROME): Status: ACTIVE | Noted: 2020-05-26

## 2022-03-20 PROBLEM — E66.01 SEVERE OBESITY WITH BODY MASS INDEX (BMI) OF 35.0 TO 39.9 WITH SERIOUS COMORBIDITY (HCC): Status: ACTIVE | Noted: 2018-08-16

## 2022-12-13 RX ORDER — ACETAMINOPHEN AND CODEINE PHOSPHATE 120; 12 MG/5ML; MG/5ML
0.35 SOLUTION ORAL DAILY
Qty: 84 TABLET | Refills: 0 | Status: SHIPPED | OUTPATIENT
Start: 2022-12-13

## 2023-01-23 NOTE — PROGRESS NOTES
HPI:  Ms. Joe Varma is a 33 y.o.   OB History          0    Para        Term   0       0    AB   0    Living   0         SAB        IAB        Ectopic        Molar        Multiple        Live Births                 who is here today for a well woman exam. She complains of clots during cycle and pain during cycles. Need refill on norethindrone. 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 Cycle Length irreular Lasting 7  positive dysmenorrhea; negative postcoital bleeding    Past Medical History:  Past Medical History:   Diagnosis Date    Adverse effect of anesthesia     woke up during ankle surgery. had nausea and a migraine after endoscopy     BMI 37.0-37.9, adult     Endometriosis     GERD (gastroesophageal reflux disease)     omeprazole, zantac daily     IBS (irritable bowel syndrome)     Nausea & vomiting     post-op nausea        Past Surgical History:  Past Surgical History:   Procedure Laterality Date    COLONOSCOPY  2018    LAPAROSCOPY      ORTHOPEDIC SURGERY Left     ankle reconstruction    UPPER GASTROINTESTINAL ENDOSCOPY  2018       Allergies:   No Known Allergies    Medication History:  Current Outpatient Medications   Medication Sig Dispense Refill    norethindrone (MICRONOR) 0.35 MG tablet Take 1 tablet by mouth daily 84 tablet 0    diclofenac (VOLTAREN) 50 MG EC tablet Take 50 mg by mouth 2 times daily      eletriptan (RELPAX) 20 MG tablet Take 1 tablet at the onset of a migraine daily prn. Max Dose: 3 tablets weekly.       Fremanezumab-vfrm (AJOVY) 225 MG/1.5ML SOSY INJECT 225 MG (ONE SYRINGE) UNDER THE SKIN EVERY 28 DAYS      metFORMIN (GLUCOPHAGE-XR) 500 MG extended release tablet 1 po at supper      ondansetron (ZOFRAN) 4 MG tablet TAKE ONE TABLET BY MOUTH EVERY 6 HOURS AS NEEDED FOR NAUSEA AND VOMITING      pregabalin (LYRICA) 75 MG capsule 1 pill at bedtime for 5 days, then 2 times daily for 5 days, then 3 pills a day as tolerated. SUMAtriptan Succinate Refill 6 MG/0.5ML SOCT Inject 6 mg at onset may repeat 1 hour after initial dose if needed. Max 12 mg (2 inj)/24 hours      traZODone (DESYREL) 100 MG tablet Take 100 mg by mouth       No current facility-administered medications for this visit. Social History:  Social History     Socioeconomic History    Marital status: Single     Spouse name: Not on file    Number of children: Not on file    Years of education: Not on file    Highest education level: Not on file   Occupational History    Not on file   Tobacco Use    Smoking status: Never    Smokeless tobacco: Never   Vaping Use    Vaping Use: Never used   Substance and Sexual Activity    Alcohol use: Yes    Drug use: No    Sexual activity: Not Currently     Birth control/protection: Pill   Other Topics Concern    Not on file   Social History Narrative    Not on file     Social Determinants of Health     Financial Resource Strain: Not on file   Food Insecurity: Not on file   Transportation Needs: Not on file   Physical Activity: Not on file   Stress: Not on file   Social Connections: Not on file   Intimate Partner Violence: Not on file   Housing Stability: Not on file       Family History:  Family History   Problem Relation Age of Onset    Cancer Maternal Grandmother         Breast, ovarian    Osteoarthritis Mother     Diabetes Father         type 2       Review of Systems - General ROS: negative except for that discussed in HPI      ROS:  Feeling well. No dyspnea or chest pain on exertion.   No abdominal pain, change in bowel habits, black or bloody stools. No urinary tract symptoms. No neurological complaints. Objective:   /78   Ht 5' 5\" (1.651 m)   Wt (!) 320 lb (145.2 kg)   LMP 01/14/2023   BMI 53.25 kg/m²     No results found for any visits on 01/25/23. The patient appears well, alert, oriented x 3, in no distress. ENT normal.  Neck supple. No adenopathy or thyromegaly. Lungs:  clear, good air entry, no wheezes, rhonchi or rales. Heart:  S1 and S2 normal, no murmurs, regular rate and rhythm. Abdomen:  soft without tenderness, guarding, mass or organomegaly. Extremities show no edema, normal peripheral pulses. Neurological is normal, no focal findings. BREAST EXAM: breasts appear normal, no suspicious masses, no skin or nipple changes or axillary nodes    PELVIC EXAM: External genitalia is within normal limits, urethra, urethra meatus and bladder are midline well supported. Vagina is well rugated, SSE not done secondary to no coitarche  Bimanual limited no gross abnormalities    Assessment/Plan:      Diagnosis Orders   1. Well woman exam  AMB POC URINALYSIS DIP STICK MANUAL W/O MICRO      2. Screening for genitourinary condition  AMB POC URINALYSIS DIP STICK MANUAL W/O MICRO      3. Screening for cervical cancer        4. PCOS (polycystic ovarian syndrome)        5. Severe obesity with body mass index (BMI) of 35.0 to 39.9 with serious comorbidity ([address]          Encounter Diagnoses   Name Primary?     Well woman exam Yes    Screening for genitourinary condition     Screening for cervical cancer     PCOS (polycystic ovarian syndrome)     Severe obesity with body mass index (BMI) of 35.0 to 39.9 with serious comorbidity ([address]      Orders Placed This Encounter   Procedures    AMB POC URINALYSIS DIP STICK MANUAL W/O MICRO       Start spironoolactone 50 daily for hair growth  Continue micronor  No pap never sexually active  History of trauma/previous relationship- in counseling  F/u with PCP re insulin resistance pre dm consider glp1 therapy  Briefly discussed weight loss surgery    Ramirez Becerra RN

## 2023-01-25 ENCOUNTER — OFFICE VISIT (OUTPATIENT)
Dept: OBGYN CLINIC | Age: 28
End: 2023-01-25
Payer: COMMERCIAL

## 2023-01-25 VITALS
DIASTOLIC BLOOD PRESSURE: 78 MMHG | BODY MASS INDEX: 48.82 KG/M2 | SYSTOLIC BLOOD PRESSURE: 120 MMHG | HEIGHT: 65 IN | WEIGHT: 293 LBS

## 2023-01-25 DIAGNOSIS — Z01.419 WELL WOMAN EXAM: Primary | ICD-10-CM

## 2023-01-25 DIAGNOSIS — Z13.89 SCREENING FOR GENITOURINARY CONDITION: ICD-10-CM

## 2023-01-25 DIAGNOSIS — E28.2 PCOS (POLYCYSTIC OVARIAN SYNDROME): ICD-10-CM

## 2023-01-25 DIAGNOSIS — E66.01 SEVERE OBESITY WITH BODY MASS INDEX (BMI) OF 35.0 TO 39.9 WITH SERIOUS COMORBIDITY (HCC): ICD-10-CM

## 2023-01-25 PROCEDURE — 99395 PREV VISIT EST AGE 18-39: CPT | Performed by: OBSTETRICS & GYNECOLOGY

## 2023-01-25 RX ORDER — ACETAMINOPHEN AND CODEINE PHOSPHATE 120; 12 MG/5ML; MG/5ML
0.35 SOLUTION ORAL DAILY
Qty: 30 TABLET | Refills: 11 | Status: SHIPPED | OUTPATIENT
Start: 2023-01-25

## 2023-01-25 RX ORDER — SPIRONOLACTONE 50 MG/1
50 TABLET, FILM COATED ORAL DAILY
Qty: 30 TABLET | Refills: 1 | Status: SHIPPED | OUTPATIENT
Start: 2023-01-25

## 2023-03-27 NOTE — PROGRESS NOTES
The patient is a 33 y.o. No obstetric history on file. who is seen for follow up on Spironolactone (started 1/25/2023). Pt reports no changes to hair growth since starting spironolactone. Pt states hair grows back within one day of removal.  Discussed another 4 pounds of weight gain with pt has not reached out ot her pcp re glp1 meds I feel like this would help her much more than metformin  HISTORY:    No obstetric history on file. Patient's last menstrual period was 03/16/2023 (exact date). Sexual History:  not sexually active  Contraception:  none  Current Outpatient Medications on File Prior to Visit   Medication Sig Dispense Refill    norethindrone (MICRONOR) 0.35 MG tablet Take 1 tablet by mouth daily 30 tablet 11    spironolactone (ALDACTONE) 50 MG tablet Take 1 tablet by mouth daily 30 tablet 1    eletriptan (RELPAX) 20 MG tablet Take 1 tablet at the onset of a migraine daily prn. Max Dose: 3 tablets weekly. Fremanezumab-vfrm (AJOVY) 225 MG/1.5ML SOSY INJECT 225 MG (ONE SYRINGE) UNDER THE SKIN EVERY 28 DAYS      metFORMIN (GLUCOPHAGE-XR) 500 MG extended release tablet 1 po at supper      ondansetron (ZOFRAN) 4 MG tablet TAKE ONE TABLET BY MOUTH EVERY 6 HOURS AS NEEDED FOR NAUSEA AND VOMITING      pregabalin (LYRICA) 75 MG capsule 1 pill at bedtime for 5 days, then 2 times daily for 5 days, then 3 pills a day as tolerated. SUMAtriptan Succinate Refill 6 MG/0.5ML SOCT Inject 6 mg at onset may repeat 1 hour after initial dose if needed. Max 12 mg (2 inj)/24 hours      traZODone (DESYREL) 100 MG tablet Take 100 mg by mouth       No current facility-administered medications on file prior to visit. ROS:  Feeling well. No dyspnea or chest pain on exertion. No abdominal pain, change in bowel habits, black or bloody stools. No urinary tract symptoms.  GYN ROS: no breast pain or new or enlarging lumps on self exam.    PHYSICAL EXAM:  Blood pressure 122/78, height 5' 5\" (1.651 m), weight (!) 324 lb 3.2

## 2023-03-29 ENCOUNTER — OFFICE VISIT (OUTPATIENT)
Dept: OBGYN CLINIC | Age: 28
End: 2023-03-29
Payer: COMMERCIAL

## 2023-03-29 VITALS
WEIGHT: 293 LBS | DIASTOLIC BLOOD PRESSURE: 78 MMHG | SYSTOLIC BLOOD PRESSURE: 122 MMHG | HEIGHT: 65 IN | BODY MASS INDEX: 48.82 KG/M2

## 2023-03-29 DIAGNOSIS — E66.01 SEVERE OBESITY WITH BODY MASS INDEX (BMI) OF 35.0 TO 39.9 WITH SERIOUS COMORBIDITY (HCC): ICD-10-CM

## 2023-03-29 DIAGNOSIS — E28.2 PCOS (POLYCYSTIC OVARIAN SYNDROME): Primary | ICD-10-CM

## 2023-03-29 PROCEDURE — 99214 OFFICE O/P EST MOD 30 MIN: CPT | Performed by: OBSTETRICS & GYNECOLOGY

## 2023-03-29 RX ORDER — SPIRONOLACTONE 50 MG/1
100 TABLET, FILM COATED ORAL 2 TIMES DAILY
Qty: 30 TABLET | Refills: 11 | Status: SHIPPED | OUTPATIENT
Start: 2023-03-29

## 2023-06-27 ENCOUNTER — OFFICE VISIT (OUTPATIENT)
Dept: OBGYN CLINIC | Age: 28
End: 2023-06-27
Payer: COMMERCIAL

## 2023-06-27 VITALS
SYSTOLIC BLOOD PRESSURE: 128 MMHG | WEIGHT: 293 LBS | HEIGHT: 67 IN | DIASTOLIC BLOOD PRESSURE: 82 MMHG | BODY MASS INDEX: 45.99 KG/M2

## 2023-06-27 DIAGNOSIS — E66.01 CLASS 3 SEVERE OBESITY DUE TO EXCESS CALORIES WITH SERIOUS COMORBIDITY IN ADULT, UNSPECIFIED BMI (HCC): ICD-10-CM

## 2023-06-27 DIAGNOSIS — E28.2 PCOS (POLYCYSTIC OVARIAN SYNDROME): Primary | ICD-10-CM

## 2023-06-27 PROBLEM — E66.813 CLASS 3 SEVERE OBESITY IN ADULT: Status: ACTIVE | Noted: 2023-06-27

## 2023-06-27 PROCEDURE — 99214 OFFICE O/P EST MOD 30 MIN: CPT | Performed by: OBSTETRICS & GYNECOLOGY

## 2023-06-27 RX ORDER — SPIRONOLACTONE 50 MG/1
100 TABLET, FILM COATED ORAL 2 TIMES DAILY
Qty: 120 TABLET | Refills: 11 | Status: SHIPPED | OUTPATIENT
Start: 2023-06-27

## 2023-07-18 ENCOUNTER — PROCEDURE VISIT (OUTPATIENT)
Dept: OBGYN CLINIC | Age: 28
End: 2023-07-18
Payer: COMMERCIAL

## 2023-07-18 ENCOUNTER — TELEPHONE (OUTPATIENT)
Dept: OBGYN CLINIC | Age: 28
End: 2023-07-18

## 2023-07-18 DIAGNOSIS — N93.9 MENSTRUAL BLEEDING PROBLEM: ICD-10-CM

## 2023-07-18 DIAGNOSIS — E28.2 PCOS (POLYCYSTIC OVARIAN SYNDROME): Primary | ICD-10-CM

## 2023-07-18 PROCEDURE — 76830 TRANSVAGINAL US NON-OB: CPT | Performed by: OBSTETRICS & GYNECOLOGY

## 2023-07-18 NOTE — TELEPHONE ENCOUNTER
----- Message from Jaya Toro MD sent at 7/18/2023 12:56 PM EDT -----  Notify no anatomic reason for dysfunctional uterine bleeding  ----- Message -----  From: Rhae Nageotte  Sent: 7/18/2023   9:28 AM EDT  To: Jaya Toro MD

## 2023-07-18 NOTE — TELEPHONE ENCOUNTER
Called to review US report with patient. No answer, LVM informing patient a [employer] message was sent. No medical information left on voicemail.

## 2024-02-07 DIAGNOSIS — Z01.419 WELL WOMAN EXAM: ICD-10-CM

## 2024-02-07 DIAGNOSIS — E28.2 PCOS (POLYCYSTIC OVARIAN SYNDROME): ICD-10-CM

## 2024-02-07 RX ORDER — ACETAMINOPHEN AND CODEINE PHOSPHATE 120; 12 MG/5ML; MG/5ML
0.35 SOLUTION ORAL DAILY
Qty: 30 TABLET | Refills: 0 | Status: SHIPPED | OUTPATIENT
Start: 2024-02-07

## 2024-02-26 NOTE — PROGRESS NOTES
HPI:  Ms. Mason is a 28 y.o.   OB History          0    Para        Term   0       0    AB   0    Living   0         SAB        IAB        Ectopic        Molar        Multiple        Live Births                 who is here today for a well woman exam. She complains of nothing. Pt would like to discuss other birth control options, she is currently taking Micronor.   Pt has never been sexually active.   Has lost 85 pounds tracking food and exercising     Date Performed Result   PAP 18 Neg, HPV was not tested   Mammogram NA NA   Colonoscopy 2018 Wnl per pt   Dexa NA NA         No obstetric history on file.        GYN History           No LMP recorded.   Cycle Length 5-30  Lasting 7  negative dysmenorrhea;       Past Medical History:  Past Medical History:   Diagnosis Date    Adverse effect of anesthesia     woke up during ankle surgery. had nausea and a migraine after endoscopy     BMI 37.0-37.9, adult     Endometriosis     GERD (gastroesophageal reflux disease)     omeprazole, zantac daily     IBS (irritable bowel syndrome)     Nausea & vomiting     post-op nausea        Past Surgical History:  Past Surgical History:   Procedure Laterality Date    COLONOSCOPY  2018    LAPAROSCOPY      ORTHOPEDIC SURGERY Left     ankle reconstruction    UPPER GASTROINTESTINAL ENDOSCOPY  2018       Allergies:   No Known Allergies    Medication History:  Current Outpatient Medications   Medication Sig Dispense Refill    norethindrone (MICRONOR) 0.35 MG tablet Take 1 tablet by mouth daily 30 tablet 0    metFORMIN (GLUCOPHAGE) 500 MG tablet Take 1 tablet by mouth 3 times daily (with meals) 180 tablet 11    spironolactone (ALDACTONE) 50 MG tablet Take 2 tablets by mouth 2 times daily 120 tablet 11    eletriptan (RELPAX) 20 MG tablet Take 1 tablet at the onset of a migraine daily prn.Max Dose: 3 tablets weekly.      Fremanezumab-vfrm (AJOVY) 225 MG/1.5ML SOSY INJECT 225 MG (ONE SYRINGE) UNDER THE SKIN EVERY 28 DAYS

## 2024-02-28 ENCOUNTER — OFFICE VISIT (OUTPATIENT)
Dept: OBGYN CLINIC | Age: 29
End: 2024-02-28
Payer: COMMERCIAL

## 2024-02-28 VITALS
WEIGHT: 241.3 LBS | HEIGHT: 67 IN | DIASTOLIC BLOOD PRESSURE: 64 MMHG | SYSTOLIC BLOOD PRESSURE: 116 MMHG | BODY MASS INDEX: 37.87 KG/M2

## 2024-02-28 DIAGNOSIS — Z01.419 WELL WOMAN EXAM: ICD-10-CM

## 2024-02-28 DIAGNOSIS — Z12.4 SCREENING FOR CERVICAL CANCER: ICD-10-CM

## 2024-02-28 DIAGNOSIS — Z13.228 SCREENING FOR METABOLIC DISORDER: ICD-10-CM

## 2024-02-28 DIAGNOSIS — Z13.1 SCREENING FOR DIABETES MELLITUS: ICD-10-CM

## 2024-02-28 DIAGNOSIS — Z13.220 SCREENING CHOLESTEROL LEVEL: ICD-10-CM

## 2024-02-28 DIAGNOSIS — G43.109 MIGRAINE WITH AURA AND WITHOUT STATUS MIGRAINOSUS, NOT INTRACTABLE: ICD-10-CM

## 2024-02-28 DIAGNOSIS — Z13.89 SCREENING FOR GENITOURINARY CONDITION: Primary | ICD-10-CM

## 2024-02-28 DIAGNOSIS — E28.2 PCOS (POLYCYSTIC OVARIAN SYNDROME): ICD-10-CM

## 2024-02-28 LAB
ALBUMIN SERPL-MCNC: 4 G/DL (ref 3.5–5)
ALBUMIN/GLOB SERPL: 1.1 (ref 0.4–1.6)
ALP SERPL-CCNC: 66 U/L (ref 50–136)
ALT SERPL-CCNC: 36 U/L (ref 12–65)
ANION GAP SERPL CALC-SCNC: 3 MMOL/L (ref 2–11)
AST SERPL-CCNC: 20 U/L (ref 15–37)
BILIRUB SERPL-MCNC: 0.4 MG/DL (ref 0.2–1.1)
BILIRUBIN, URINE, POC: NEGATIVE
BLOOD URINE, POC: NEGATIVE
BUN SERPL-MCNC: 14 MG/DL (ref 6–23)
CALCIUM SERPL-MCNC: 9.5 MG/DL (ref 8.3–10.4)
CHLORIDE SERPL-SCNC: 106 MMOL/L (ref 103–113)
CHOLEST SERPL-MCNC: 201 MG/DL
CO2 SERPL-SCNC: 29 MMOL/L (ref 21–32)
CREAT SERPL-MCNC: 1 MG/DL (ref 0.6–1)
GLOBULIN SER CALC-MCNC: 3.6 G/DL (ref 2.8–4.5)
GLUCOSE SERPL-MCNC: 92 MG/DL (ref 65–100)
GLUCOSE URINE, POC: NEGATIVE
HDLC SERPL-MCNC: 47 MG/DL (ref 40–60)
HDLC SERPL: 4.3
KETONES, URINE, POC: NEGATIVE
LDLC SERPL CALC-MCNC: 120.8 MG/DL
LEUKOCYTE ESTERASE, URINE, POC: NEGATIVE
NITRITE, URINE, POC: NEGATIVE
PH, URINE, POC: 6 (ref 4.6–8)
POTASSIUM SERPL-SCNC: 4.6 MMOL/L (ref 3.5–5.1)
PROT SERPL-MCNC: 7.6 G/DL (ref 6.3–8.2)
PROTEIN,URINE, POC: NEGATIVE
SODIUM SERPL-SCNC: 138 MMOL/L (ref 136–146)
SPECIFIC GRAVITY, URINE, POC: 1.02 (ref 1–1.03)
TRIGL SERPL-MCNC: 166 MG/DL (ref 35–150)
URINALYSIS CLARITY, POC: CLEAR
URINALYSIS COLOR, POC: YELLOW
UROBILINOGEN, POC: NORMAL
VLDLC SERPL CALC-MCNC: 33.2 MG/DL (ref 6–23)

## 2024-02-28 PROCEDURE — 99395 PREV VISIT EST AGE 18-39: CPT | Performed by: OBSTETRICS & GYNECOLOGY

## 2024-02-28 PROCEDURE — 81003 URINALYSIS AUTO W/O SCOPE: CPT | Performed by: OBSTETRICS & GYNECOLOGY

## 2024-02-28 RX ORDER — ACETAMINOPHEN AND CODEINE PHOSPHATE 120; 12 MG/5ML; MG/5ML
0.35 SOLUTION ORAL DAILY
Qty: 90 TABLET | Refills: 3 | Status: SHIPPED | OUTPATIENT
Start: 2024-02-28

## 2024-02-29 LAB
EST. AVERAGE GLUCOSE BLD GHB EST-MCNC: 100 MG/DL
HBA1C MFR BLD: 5.1 % (ref 4.8–5.6)

## 2024-03-01 LAB — INSULIN SERPL-ACNC: 12.3 UIU/ML (ref 2.6–24.9)

## 2024-03-04 ENCOUNTER — TELEPHONE (OUTPATIENT)
Dept: OBGYN CLINIC | Age: 29
End: 2024-03-04

## 2024-03-04 NOTE — TELEPHONE ENCOUNTER
----- Message from Olivia Grossman MD sent at 3/4/2024 12:17 PM EST -----  Notify pt insulin improved- congrats- would f/u with pcp re lipid abnormalities   ----- Message -----  From: Valdez Luna Incoming [place] W[employer]  Sent: 2/28/2024   7:26 PM EST  To: Olivia Grossman MD

## 2024-03-06 LAB
COLLECTION METHOD: NORMAL
CYTOLOGIST CVX/VAG CYTO: NORMAL
CYTOLOGY CVX/VAG DOC THIN PREP: NORMAL
DATE OF LMP: NORMAL
HPV REFLEX: NORMAL
Lab: NORMAL
PAP SOURCE: NORMAL
PATH REPORT.FINAL DX SPEC: NORMAL
STAT OF ADQ CVX/VAG CYTO-IMP: NORMAL

## 2024-03-23 ENCOUNTER — APPOINTMENT (OUTPATIENT)
Dept: GENERAL RADIOLOGY | Age: 29
End: 2024-03-23
Payer: COMMERCIAL

## 2024-03-23 ENCOUNTER — HOSPITAL ENCOUNTER (EMERGENCY)
Age: 29
Discharge: HOME | End: 2024-03-24
Attending: EMERGENCY MEDICINE
Payer: COMMERCIAL

## 2024-03-23 VITALS
OXYGEN SATURATION: 100 % | DIASTOLIC BLOOD PRESSURE: 96 MMHG | SYSTOLIC BLOOD PRESSURE: 134 MMHG | WEIGHT: 250 LBS | RESPIRATION RATE: 20 BRPM | HEART RATE: 94 BPM | TEMPERATURE: 98 F | HEIGHT: 66 IN | BODY MASS INDEX: 40.18 KG/M2

## 2024-03-23 DIAGNOSIS — M54.50 ACUTE BILATERAL LOW BACK PAIN WITHOUT SCIATICA: Primary | ICD-10-CM

## 2024-03-23 PROCEDURE — 96372 THER/PROPH/DIAG INJ SC/IM: CPT

## 2024-03-23 PROCEDURE — 6360000002 HC RX W HCPCS: Performed by: EMERGENCY MEDICINE

## 2024-03-23 PROCEDURE — 72100 X-RAY EXAM L-S SPINE 2/3 VWS: CPT

## 2024-03-23 PROCEDURE — 99284 EMERGENCY DEPT VISIT MOD MDM: CPT

## 2024-03-23 RX ORDER — PREDNISONE 20 MG/1
40 TABLET ORAL DAILY
Qty: 8 TABLET | Refills: 0 | Status: SHIPPED | OUTPATIENT
Start: 2024-03-23 | End: 2024-03-27

## 2024-03-23 RX ORDER — KETOROLAC TROMETHAMINE 30 MG/ML
30 INJECTION, SOLUTION INTRAMUSCULAR; INTRAVENOUS
Status: COMPLETED | OUTPATIENT
Start: 2024-03-23 | End: 2024-03-23

## 2024-03-23 RX ORDER — DEXAMETHASONE SODIUM PHOSPHATE 10 MG/ML
8 INJECTION INTRAMUSCULAR; INTRAVENOUS ONCE
Status: COMPLETED | OUTPATIENT
Start: 2024-03-23 | End: 2024-03-23

## 2024-03-23 RX ORDER — CYCLOBENZAPRINE HCL 10 MG
10 TABLET ORAL 3 TIMES DAILY PRN
Qty: 21 TABLET | Refills: 0 | Status: SHIPPED | OUTPATIENT
Start: 2024-03-23 | End: 2024-04-02

## 2024-03-23 RX ADMIN — DEXAMETHASONE SODIUM PHOSPHATE 8 MG: 10 INJECTION INTRAMUSCULAR; INTRAVENOUS at 23:51

## 2024-03-23 RX ADMIN — KETOROLAC TROMETHAMINE 30 MG: 30 INJECTION, SOLUTION INTRAMUSCULAR at 23:51

## 2024-03-23 ASSESSMENT — LIFESTYLE VARIABLES
HOW MANY STANDARD DRINKS CONTAINING ALCOHOL DO YOU HAVE ON A TYPICAL DAY: PATIENT DOES NOT DRINK
HOW OFTEN DO YOU HAVE A DRINK CONTAINING ALCOHOL: NEVER

## 2024-03-23 ASSESSMENT — PAIN - FUNCTIONAL ASSESSMENT: PAIN_FUNCTIONAL_ASSESSMENT: 0-10

## 2024-03-23 ASSESSMENT — PAIN SCALES - GENERAL: PAINLEVEL_OUTOF10: 8

## 2024-03-24 NOTE — ED PROVIDER NOTES
Emergency Department Provider Note       PCP: Lotus Escobedo MD   Age: 28 y.o.   Sex: female     DISPOSITION Decision To Discharge 03/23/2024 11:56:53 PM       ICD-10-CM    1. Acute bilateral low back pain without sciatica  M54.50           Medical Decision Making     Patient with back pain after sitting up putting on clothes.  Worse on the right.  No saddle anesthesia or change in bowel or bladder function.  Pain does not go down the leg.  X-ray pending.  If no acute will discharge with medicine at home after treatment here.     1 or more acute illnesses that pose a threat to life or bodily function.   Prescription drug management performed.  Patient was discharged risks and benefits of hospitalization were considered.  Shared medical decision making was utilized in creating the patients health plan today.    I independently ordered and reviewed each unique test.                     History     Patient had some lower back pain bilateral worse on the right.  Started today after bending over putting on close.  Has continued all day.  No saddle anesthesia or change in bowel or bladder function.  The pain does not radiate down either leg.  Worse with certain movements.  Here for evaluation.    The history is provided by the patient. No  was used.   Back Pain  Location:  Lumbar spine  Quality:  Aching  Radiates to:  Does not radiate  Pain severity:  Moderate  Duration:  1 day  Timing:  Constant  Progression:  Waxing and waning  Chronicity:  New  Context: twisting    Relieved by:  Being still  Worsened by:  Movement and palpation  Associated symptoms: no abdominal pain, no abdominal swelling, no bladder incontinence, no bowel incontinence, no chest pain, no dysuria, no fever, no headaches, no leg pain, no numbness, no perianal numbness, no tingling and no weakness        ROS     Review of Systems   Constitutional:  Negative for chills and fever.   HENT:  Negative for rhinorrhea and sore throat.

## 2024-03-24 NOTE — ED TRIAGE NOTES
Pt presents to ED with c/o back pain. Pt was bending over to get dressed when she pulled her back.

## 2024-03-24 NOTE — ED NOTES
I have reviewed discharge instructions with the patient.  The patient verbalized understanding.    Patient left ED via Discharge Method: wheelchair to Home with (insert name of family/friend, self, transport friend).    Opportunity for questions and clarification provided.       Patient given 3 scripts.         To continue your aftercare when you leave the hospital, you may receive an automated call from our care team to check in on how you are doing.  This is a free service and part of our promise to provide the best care and service to meet your aftercare needs.” If you have questions, or wish to unsubscribe from this service please call [phone].  Thank you for Choosing our [facility] Emergency Department.

## 2024-07-22 DIAGNOSIS — E28.2 PCOS (POLYCYSTIC OVARIAN SYNDROME): ICD-10-CM

## 2024-07-22 RX ORDER — SPIRONOLACTONE 50 MG/1
100 TABLET, FILM COATED ORAL 2 TIMES DAILY
Qty: 120 TABLET | Refills: 11 | OUTPATIENT
Start: 2024-07-22

## 2024-07-29 DIAGNOSIS — E28.2 PCOS (POLYCYSTIC OVARIAN SYNDROME): ICD-10-CM

## 2024-07-29 RX ORDER — SPIRONOLACTONE 50 MG/1
100 TABLET, FILM COATED ORAL 2 TIMES DAILY
Qty: 120 TABLET | Refills: 11 | Status: CANCELLED | OUTPATIENT
Start: 2024-07-29

## 2024-07-30 DIAGNOSIS — E28.2 PCOS (POLYCYSTIC OVARIAN SYNDROME): ICD-10-CM

## 2024-07-30 RX ORDER — SPIRONOLACTONE 50 MG/1
100 TABLET, FILM COATED ORAL 2 TIMES DAILY
Qty: 120 TABLET | Refills: 11 | Status: SHIPPED | OUTPATIENT
Start: 2024-07-30

## 2025-02-03 DIAGNOSIS — E28.2 PCOS (POLYCYSTIC OVARIAN SYNDROME): ICD-10-CM

## 2025-02-03 DIAGNOSIS — Z01.419 WELL WOMAN EXAM: ICD-10-CM

## 2025-02-03 RX ORDER — ACETAMINOPHEN AND CODEINE PHOSPHATE 120; 12 MG/5ML; MG/5ML
0.35 SOLUTION ORAL DAILY
Qty: 90 TABLET | Refills: 0 | Status: SHIPPED | OUTPATIENT
Start: 2025-02-03

## 2025-05-19 ENCOUNTER — PATIENT MESSAGE (OUTPATIENT)
Dept: OBGYN CLINIC | Age: 30
End: 2025-05-19

## 2025-05-19 DIAGNOSIS — Z01.419 WELL WOMAN EXAM: ICD-10-CM

## 2025-05-19 DIAGNOSIS — E28.2 PCOS (POLYCYSTIC OVARIAN SYNDROME): ICD-10-CM

## 2025-05-19 RX ORDER — ACETAMINOPHEN AND CODEINE PHOSPHATE 120; 12 MG/5ML; MG/5ML
0.35 SOLUTION ORAL DAILY
Qty: 90 TABLET | Refills: 0 | OUTPATIENT
Start: 2025-05-19

## 2025-05-19 RX ORDER — ACETAMINOPHEN AND CODEINE PHOSPHATE 120; 12 MG/5ML; MG/5ML
0.35 SOLUTION ORAL DAILY
Qty: 90 TABLET | Refills: 0 | Status: SHIPPED | OUTPATIENT
Start: 2025-05-19

## 2025-08-15 DIAGNOSIS — E28.2 PCOS (POLYCYSTIC OVARIAN SYNDROME): ICD-10-CM

## 2025-08-15 DIAGNOSIS — Z01.419 WELL WOMAN EXAM: ICD-10-CM

## 2025-08-15 RX ORDER — SPIRONOLACTONE 50 MG/1
100 TABLET, FILM COATED ORAL 2 TIMES DAILY
Qty: 120 TABLET | Refills: 2 | Status: SHIPPED | OUTPATIENT
Start: 2025-08-15

## 2025-08-15 RX ORDER — ACETAMINOPHEN AND CODEINE PHOSPHATE 120; 12 MG/5ML; MG/5ML
0.35 SOLUTION ORAL DAILY
Qty: 90 TABLET | Refills: 0 | Status: SHIPPED | OUTPATIENT
Start: 2025-08-15

## (undated) DEVICE — Z DUPLICATE USE 2847003 INJECTOR UTER

## (undated) DEVICE — INSUFFLATION NEEDLE: Brand: SURGINEEDLE

## (undated) DEVICE — SOLUTION IV 1000ML 0.9% SOD CHL

## (undated) DEVICE — BLADELESS OPTICAL TROCAR WITH FIXATION CANNULA: Brand: VERSAPORT

## (undated) DEVICE — 40585 XL ADVANCED TRENDELENBURG POSITIONING KIT: Brand: 40585 XL ADVANCED TRENDELENBURG POSITIONING KIT

## (undated) DEVICE — APPLICATOR BNDG 1MM ADH PREMIERPRO EXOFIN

## (undated) DEVICE — REM POLYHESIVE ADULT PATIENT RETURN ELECTRODE: Brand: VALLEYLAB

## (undated) DEVICE — INTENDED FOR TISSUE SEPARATION, AND OTHER PROCEDURES THAT REQUIRE A SHARP SURGICAL BLADE TO PUNCTURE OR CUT.: Brand: BARD-PARKER SAFETY BLADES SIZE 11, STERILE

## (undated) DEVICE — KENDALL SCD EXPRESS SLEEVES, KNEE LENGTH, MEDIUM: Brand: KENDALL SCD

## (undated) DEVICE — SUTURE SZ 0 27IN 5/8 CIR UR-6  TAPER PT VIOLET ABSRB VICRYL J603H

## (undated) DEVICE — BLADELESS OPTICAL TROCAR WITH FIXATION CANNULA: Brand: VERSAONE

## (undated) DEVICE — PERI-PAD,MODERATE: Brand: CURITY

## (undated) DEVICE — [HIGH FLOW INSUFFLATOR,  DO NOT USE IF PACKAGE IS DAMAGED,  KEEP DRY,  KEEP AWAY FROM SUNLIGHT,  PROTECT FROM HEAT AND RADIOACTIVE SOURCES.]: Brand: PNEUMOSURE

## (undated) DEVICE — SOL ANTI-FOG 6ML MEDC -- MEDICHOICE - CONVERT TO 358427

## (undated) DEVICE — 2, DISPOSABLE SUCTION/IRRIGATOR WITHOUT DISPOSABLE TIP: Brand: STRYKEFLOW

## (undated) DEVICE — TRAY PREP DRY W/ PREM GLV 2 APPL 6 SPNG 2 UNDPD 1 OVERWRAP

## (undated) DEVICE — 2000CC GUARDIAN II: Brand: GUARDIAN

## (undated) DEVICE — Device

## (undated) DEVICE — CARDINAL HEALTH FLEXIBLE LIGHT HANDLE COVER: Brand: CARDINAL HEALTH